# Patient Record
Sex: FEMALE | Race: WHITE | NOT HISPANIC OR LATINO | Employment: OTHER | URBAN - METROPOLITAN AREA
[De-identification: names, ages, dates, MRNs, and addresses within clinical notes are randomized per-mention and may not be internally consistent; named-entity substitution may affect disease eponyms.]

---

## 2017-12-11 PROBLEM — S82.891A CLOSED RIGHT ANKLE FRACTURE: Status: ACTIVE | Noted: 2017-12-11

## 2017-12-14 PROBLEM — S82.891A CLOSED FRACTURE OF RIGHT ANKLE: Status: ACTIVE | Noted: 2017-12-14

## 2018-01-26 PROBLEM — B99.9 INFECTION: Status: ACTIVE | Noted: 2018-01-26

## 2018-01-26 PROBLEM — T81.30XA WOUND DEHISCENCE: Status: ACTIVE | Noted: 2018-01-26

## 2018-01-26 PROBLEM — T81.49XA WOUND INFECTION AFTER SURGERY: Status: ACTIVE | Noted: 2018-01-26

## 2018-02-08 ENCOUNTER — OFFICE VISIT (OUTPATIENT)
Dept: INFECTIOUS DISEASES | Facility: CLINIC | Age: 33
End: 2018-02-08
Payer: MEDICAID

## 2018-02-08 VITALS — SYSTOLIC BLOOD PRESSURE: 134 MMHG | DIASTOLIC BLOOD PRESSURE: 92 MMHG | HEART RATE: 83 BPM

## 2018-02-08 PROCEDURE — 99213 OFFICE O/P EST LOW 20 MIN: CPT | Mod: PBBFAC,PO | Performed by: INTERNAL MEDICINE

## 2018-02-08 PROCEDURE — 3008F BODY MASS INDEX DOCD: CPT | Mod: ,,, | Performed by: INTERNAL MEDICINE

## 2018-02-08 PROCEDURE — 99212 OFFICE O/P EST SF 10 MIN: CPT | Mod: S$PBB,,, | Performed by: INTERNAL MEDICINE

## 2018-02-08 PROCEDURE — 99999 PR PBB SHADOW E&M-EST. PATIENT-LVL III: CPT | Mod: PBBFAC,,, | Performed by: INTERNAL MEDICINE

## 2018-02-08 NOTE — PROGRESS NOTES
Subjective:      Patient ID: Praveena Rowe is a 32 y.o. female.    Chief Complaint:Follow-up (right foot)      History of Present Illness  HPI     Follow-up    Additional comments: right foot       Last edited by Yolis Zhao LPN on 2/8/2018  2:14 PM. (History)      A 32-year-old woman with anxiety disorder, Bipolar type 1, DM, and thyroid disease who while sleep walking had an injury to her right ankle. She underwent ORIF on 12/14 and at the 6 week outpatient follow up visit was noted to an erythematous wound. She was taken to the OR for a post operative wound infection and washout with wound VAC placement. She was discharged on a 2 week course of ceftriaxone from date of operative intervention. She is her for follow up and has no complaints. She inquires about weight bearing status.    Review of Systems   Constitution: Negative for chills, decreased appetite, fever, weakness, malaise/fatigue, night sweats, weight gain and weight loss.   HENT: Negative for congestion, ear pain, hearing loss, hoarse voice, sore throat and tinnitus.    Eyes: Negative for blurred vision, redness and visual disturbance.   Cardiovascular: Negative for chest pain, leg swelling and palpitations.   Respiratory: Negative for cough, hemoptysis, shortness of breath and sputum production.    Hematologic/Lymphatic: Negative for adenopathy. Does not bruise/bleed easily.   Skin: Negative for dry skin, itching, rash and suspicious lesions.   Musculoskeletal: Negative for back pain, joint pain, myalgias and neck pain.   Gastrointestinal: Positive for diarrhea (one episode ). Negative for abdominal pain, constipation, heartburn, nausea and vomiting.   Genitourinary: Negative for dysuria, flank pain, frequency, hematuria, hesitancy and urgency.   Neurological: Negative for dizziness, headaches, numbness and paresthesias.   Psychiatric/Behavioral: Negative for depression and memory loss. The patient does not have insomnia and is not  nervous/anxious.      Objective:   Physical Exam   Constitutional: She is oriented to person, place, and time. She appears well-developed and well-nourished. She is cooperative. She is easily aroused.  Non-toxic appearance. No distress.   Obese   HENT:   Head: Normocephalic and atraumatic. Head is without right periorbital erythema and without left periorbital erythema.   Right Ear: Hearing, tympanic membrane, external ear and ear canal normal. No swelling.   Left Ear: Hearing, tympanic membrane, external ear and ear canal normal. No swelling.   Nose: Nose normal. No nasal deformity.   Mouth/Throat: Uvula is midline, oropharynx is clear and moist and mucous membranes are normal. No oropharyngeal exudate.   Eyes: Conjunctivae, EOM and lids are normal. Pupils are equal, round, and reactive to light. Right eye exhibits no discharge and no exudate. Left eye exhibits no discharge and no exudate. Right conjunctiva is not injected. Left conjunctiva is not injected. No scleral icterus.   Neck: Normal range of motion. Neck supple. No tracheal deviation present. No thyromegaly present.   Cardiovascular: Normal rate, regular rhythm, S1 normal, S2 normal, normal heart sounds and intact distal pulses.  Exam reveals no gallop and no friction rub.    No murmur heard.  Pulmonary/Chest: Effort normal and breath sounds normal. No accessory muscle usage or stridor. No tachypnea. No respiratory distress. She has no wheezes. She has no rales. She exhibits no tenderness.   Abdominal: Soft. Normal appearance and bowel sounds are normal. She exhibits no distension. There is no tenderness. There is no rebound and no guarding.   Musculoskeletal: Normal range of motion. She exhibits no edema or tenderness.        Right ankle: She exhibits swelling.        Feet:    Neurological: She is alert, oriented to person, place, and time and easily aroused. No cranial nerve deficit. Coordination normal.   Skin: Skin is warm and dry. No lesion and no  rash noted. She is not diaphoretic. No cyanosis or erythema. No pallor. Nails show no clubbing.   Psychiatric: She has a normal mood and affect. Her speech is normal and behavior is normal. Judgment and thought content normal.   Nursing note and vitals reviewed.    Assessment:       1. Postoperative wound infection, subsequent encounter          Plan:   Patient is afebrile and without leukocytosis. ESR and CRP reviewed. ESR up when compared to 9 days ago and it's significance is unclear. CRP with slight down trend. No obvious signs of infection and wound healing.   · Complete ceftriaxone. Has 5 more days left.  · Remove PICC line on 2/14/18.  · RTC as needed.

## 2018-08-06 ENCOUNTER — HOSPITAL ENCOUNTER (EMERGENCY)
Facility: HOSPITAL | Age: 33
Discharge: PSYCHIATRIC OR SUBSTANCE USE FACILITY | End: 2018-08-07
Attending: EMERGENCY MEDICINE
Payer: MEDICAID

## 2018-08-06 DIAGNOSIS — R45.851 SUICIDAL IDEATION: Primary | ICD-10-CM

## 2018-08-06 DIAGNOSIS — Z00.8 MEDICAL CLEARANCE FOR PSYCHIATRIC ADMISSION: ICD-10-CM

## 2018-08-06 LAB
ALBUMIN SERPL BCP-MCNC: 4 G/DL
ALP SERPL-CCNC: 85 U/L
ALT SERPL W/O P-5'-P-CCNC: 67 U/L
AMPHET+METHAMPHET UR QL: NORMAL
ANION GAP SERPL CALC-SCNC: 11 MMOL/L
APAP SERPL-MCNC: <3 UG/ML
AST SERPL-CCNC: 38 U/L
B-HCG UR QL: NEGATIVE
BARBITURATES UR QL SCN>200 NG/ML: NEGATIVE
BASOPHILS # BLD AUTO: 0 K/UL
BASOPHILS NFR BLD: 0.4 %
BENZODIAZ UR QL SCN>200 NG/ML: NEGATIVE
BILIRUB SERPL-MCNC: 0.7 MG/DL
BILIRUB UR QL STRIP: NEGATIVE
BUN SERPL-MCNC: 10 MG/DL
BZE UR QL SCN: NEGATIVE
CALCIUM SERPL-MCNC: 9.5 MG/DL
CANNABINOIDS UR QL SCN: NORMAL
CHLORIDE SERPL-SCNC: 103 MMOL/L
CLARITY UR: ABNORMAL
CO2 SERPL-SCNC: 26 MMOL/L
COLOR UR: YELLOW
CREAT SERPL-MCNC: 0.9 MG/DL
CREAT UR-MCNC: 282.5 MG/DL
CTP QC/QA: YES
DIFFERENTIAL METHOD: NORMAL
EOSINOPHIL # BLD AUTO: 0.2 K/UL
EOSINOPHIL NFR BLD: 2.9 %
ERYTHROCYTE [DISTWIDTH] IN BLOOD BY AUTOMATED COUNT: 14.2 %
EST. GFR  (AFRICAN AMERICAN): >60 ML/MIN/1.73 M^2
EST. GFR  (NON AFRICAN AMERICAN): >60 ML/MIN/1.73 M^2
ETHANOL SERPL-MCNC: <10 MG/DL
GLUCOSE SERPL-MCNC: 81 MG/DL
GLUCOSE UR QL STRIP: NEGATIVE
HCT VFR BLD AUTO: 42.3 %
HGB BLD-MCNC: 14.2 G/DL
HGB UR QL STRIP: NEGATIVE
KETONES UR QL STRIP: NEGATIVE
LEUKOCYTE ESTERASE UR QL STRIP: NEGATIVE
LYMPHOCYTES # BLD AUTO: 2.5 K/UL
LYMPHOCYTES NFR BLD: 33.2 %
MCH RBC QN AUTO: 28.2 PG
MCHC RBC AUTO-ENTMCNC: 33.6 G/DL
MCV RBC AUTO: 84 FL
METHADONE UR QL SCN>300 NG/ML: NEGATIVE
MONOCYTES # BLD AUTO: 0.7 K/UL
MONOCYTES NFR BLD: 9.6 %
NEUTROPHILS # BLD AUTO: 4.1 K/UL
NEUTROPHILS NFR BLD: 53.9 %
NITRITE UR QL STRIP: NEGATIVE
OPIATES UR QL SCN: NEGATIVE
PCP UR QL SCN>25 NG/ML: NEGATIVE
PH UR STRIP: 6 [PH] (ref 5–8)
PLATELET # BLD AUTO: 287 K/UL
PMV BLD AUTO: 11.2 FL
POTASSIUM SERPL-SCNC: 3.5 MMOL/L
PROT SERPL-MCNC: 7.4 G/DL
PROT UR QL STRIP: NEGATIVE
RBC # BLD AUTO: 5.03 M/UL
SALICYLATES SERPL-MCNC: <5 MG/DL
SODIUM SERPL-SCNC: 140 MMOL/L
SP GR UR STRIP: 1.02 (ref 1–1.03)
TOXICOLOGY INFORMATION: NORMAL
TSH SERPL DL<=0.005 MIU/L-ACNC: 2.31 UIU/ML
URN SPEC COLLECT METH UR: ABNORMAL
UROBILINOGEN UR STRIP-ACNC: 1 EU/DL
WBC # BLD AUTO: 7.5 K/UL

## 2018-08-06 PROCEDURE — 96372 THER/PROPH/DIAG INJ SC/IM: CPT

## 2018-08-06 PROCEDURE — 93010 ELECTROCARDIOGRAM REPORT: CPT | Mod: ,,, | Performed by: INTERNAL MEDICINE

## 2018-08-06 PROCEDURE — 93005 ELECTROCARDIOGRAM TRACING: CPT

## 2018-08-06 PROCEDURE — 80329 ANALGESICS NON-OPIOID 1 OR 2: CPT

## 2018-08-06 PROCEDURE — 36415 COLL VENOUS BLD VENIPUNCTURE: CPT

## 2018-08-06 PROCEDURE — 81025 URINE PREGNANCY TEST: CPT | Performed by: EMERGENCY MEDICINE

## 2018-08-06 PROCEDURE — 80307 DRUG TEST PRSMV CHEM ANLYZR: CPT

## 2018-08-06 PROCEDURE — 25000003 PHARM REV CODE 250: Performed by: EMERGENCY MEDICINE

## 2018-08-06 PROCEDURE — 84443 ASSAY THYROID STIM HORMONE: CPT

## 2018-08-06 PROCEDURE — 99285 EMERGENCY DEPT VISIT HI MDM: CPT | Mod: 25

## 2018-08-06 PROCEDURE — 80320 DRUG SCREEN QUANTALCOHOLS: CPT

## 2018-08-06 PROCEDURE — 81003 URINALYSIS AUTO W/O SCOPE: CPT | Mod: 59

## 2018-08-06 PROCEDURE — 80053 COMPREHEN METABOLIC PANEL: CPT

## 2018-08-06 PROCEDURE — 85025 COMPLETE CBC W/AUTO DIFF WBC: CPT

## 2018-08-06 RX ORDER — KETAMINE HYDROCHLORIDE 10 MG/ML
300 INJECTION, SOLUTION INTRAMUSCULAR; INTRAVENOUS
Status: COMPLETED | OUTPATIENT
Start: 2018-08-06 | End: 2018-08-06

## 2018-08-06 RX ORDER — ACETAMINOPHEN 325 MG/1
650 TABLET ORAL
Status: COMPLETED | OUTPATIENT
Start: 2018-08-06 | End: 2018-08-06

## 2018-08-06 RX ORDER — KETAMINE HYDROCHLORIDE 100 MG/ML
INJECTION, SOLUTION INTRAMUSCULAR; INTRAVENOUS
Status: DISPENSED
Start: 2018-08-06 | End: 2018-08-07

## 2018-08-06 RX ADMIN — KETAMINE HYDROCHLORIDE 300 MG: 10 INJECTION INTRAMUSCULAR; INTRAVENOUS at 06:08

## 2018-08-06 RX ADMIN — ACETAMINOPHEN 650 MG: 325 TABLET, FILM COATED ORAL at 02:08

## 2018-08-06 NOTE — ED PROVIDER NOTES
"Encounter Date: 8/6/2018    SCRIBE #1 NOTE: I, Lionel Esposito, am scribing for, and in the presence of, Dr. Ag.       History     Chief Complaint   Patient presents with    Psychiatric Evaluation     suicidal ideations     08/06/2018  10:50 AM     Praveena Rowe is a 32 y.o. female who has a pmhx of anxiety, depression, DM, Bipolar and insomnia is presenting to ED for psychiatric evaluation, sent from Dr. Magallon, for suicidal ideation. Pt had an appointment with Dr. Magallon today for an evaluation of her medications. Pt informed Dr. Magallon that she had two panic attack, she doesn't leave her house very often, and recurrent suicidal thoughts. Pt states, "I always think about hurting myself. I just want to die." She also reports sores all over body from picking at skin and cutting on stomach with razor blade when she is really sad. She has a hx of suicidal attempts with pills and cutting herself. She states that her medication is not helping with her depression disorder and she does not want to live with depression anymore. She smoked marijuana two days ago. She reports visual hallucinations. Pt has a past surgical history that includes Cyst Removal; Ankle fracture surgery; and Ankle debridement.       The history is provided by the patient.     Review of patient's allergies indicates:   Allergen Reactions    Amoxil [amoxicillin]      Past Medical History:   Diagnosis Date    Anxiety     Bipolar 1 disorder, depressed, severe     Depression     Diabetes mellitus     "pre diabetes"    Insomnia     Thyroid disease      Past Surgical History:   Procedure Laterality Date    ANKLE DEBRIDEMENT Right 01/26/2018    irrigation and debridement    ANKLE FRACTURE SURGERY      CYST REMOVAL      pt states she had a cyst removed from base of her brain when she was 13    ORIF FIBULA FRACTURE Right 12/14/2017     History reviewed. No pertinent family history.  Social History   Substance Use Topics    Smoking status: " "Never Smoker    Smokeless tobacco: Never Used    Alcohol use Yes      Comment: socially     Review of Systems   Constitutional: Negative for fever.   HENT: Negative for sore throat.    Eyes: Negative for visual disturbance.   Respiratory: Negative for cough.    Cardiovascular: Negative for chest pain.   Gastrointestinal: Negative for abdominal pain, diarrhea, nausea and vomiting.   Genitourinary: Negative for difficulty urinating and pelvic pain.   Musculoskeletal: Negative for arthralgias.   Skin: Negative for rash.        Positive for "sores" over generalized body   Neurological: Negative for weakness.   Psychiatric/Behavioral: Positive for dysphoric mood, hallucinations (visual), self-injury and suicidal ideas. Negative for confusion.       Physical Exam     Initial Vitals [08/06/18 1025]   BP Pulse Resp Temp SpO2   (!) 170/96 78 20 -- 97 %      MAP       --         Physical Exam    Nursing note and vitals reviewed.  Constitutional: She appears well-developed and well-nourished. She is not diaphoretic. No distress.   HENT:   Head: Normocephalic and atraumatic.   Mouth/Throat: Oropharynx is clear and moist.   Eyes: Conjunctivae are normal.   Neck: Neck supple.   Cardiovascular: Normal rate, regular rhythm, normal heart sounds and intact distal pulses. Exam reveals no gallop and no friction rub.    No murmur heard.  Pulmonary/Chest: Breath sounds normal. She has no wheezes. She has no rhonchi. She has no rales.   Abdominal: Soft. She exhibits no distension. There is no tenderness.   Musculoskeletal: Normal range of motion.   Neurological: She is alert and oriented to person, place, and time.   Skin: Abrasion, lesion and rash noted. Rash is papular. There is erythema.   3 cm abrasion to LUQ. Scattered papular lesions with excoriation throughout trunk and extremities. Minimum erythema.    Psychiatric: Her speech is normal. She is actively hallucinating. She expresses suicidal ideation. She expresses no homicidal " ideation. She expresses suicidal plans. She expresses no homicidal plans.   Cooperative.          ED Course   Procedures  Labs Reviewed   COMPREHENSIVE METABOLIC PANEL - Abnormal; Notable for the following:        Result Value    ALT 67 (*)     All other components within normal limits   URINALYSIS - Abnormal; Notable for the following:     Appearance, UA Hazy (*)     All other components within normal limits   ACETAMINOPHEN LEVEL - Abnormal; Notable for the following:     Acetaminophen (Tylenol), Serum <3.0 (*)     All other components within normal limits   SALICYLATE LEVEL - Abnormal; Notable for the following:     Salicylate Lvl <5.0 (*)     All other components within normal limits   CBC W/ AUTO DIFFERENTIAL   TSH   DRUG SCREEN PANEL, URINE EMERGENCY   ALCOHOL,MEDICAL (ETHANOL)   POCT URINE PREGNANCY          Imaging Results    None          Medical Decision Making:   History:   Old Medical Records: I decided to obtain old medical records.  Clinical Tests:   Lab Tests: Ordered and Reviewed  Medical Tests: Ordered and Reviewed            Scribe Attestation:   Scribe #1: I performed the above scribed service and the documentation accurately describes the services I performed. I attest to the accuracy of the note.    I, Dr. Levra Ag, personally performed the services described in this documentation. All medical record entries made by the scribe were at my direction and in my presence.  I have reviewed the chart and agree that the record reflects my personal performance and is accurate and complete. Levar Ag MD.  5:22 PM 08/06/2018    Praveena Rowe is a 32 y.o. female presenting with suicidal ideation. I did perform a focused medical assessment to explore alternative etiologies relating to the patient's presentation or conditions in need of emergent stabilization in the emergency department.  None are evident.  The patient is medically cleared for transfer to facilitate further psychiatric  evaluation.          ED Course as of Aug 06 1723   Mon Aug 06, 2018   1141 EKG:  Normal sinus rhythm at a rate of 84.  Normal intervals.  Normal axis.  No sign of acute intoxication.    [MR]      ED Course User Index  [MR] Levar Ag MD     Clinical Impression:     1. Suicidal ideation    2. Medical clearance for psychiatric admission                                   Levar Ag MD  08/06/18 4923

## 2018-08-06 NOTE — ED NOTES
Pt presents to ED c/o increased depression and anxiety attacks over the past several days. She currently denies SI/HI but sts that she has been suicidal in the past. Last inpatient psych visit was about 1 year ago. AAOx4. Skin warm, pink, and dry. Ambulatory. Family at bedside. Updated on POC and reports understanding. Suicide precautions in place. No sitter available. Charge nurse and house supervisor aware. Pt in room next to nurses station and able to be easily viewed.

## 2018-08-07 VITALS
HEART RATE: 74 BPM | RESPIRATION RATE: 20 BRPM | OXYGEN SATURATION: 98 % | WEIGHT: 293 LBS | SYSTOLIC BLOOD PRESSURE: 121 MMHG | TEMPERATURE: 98 F | DIASTOLIC BLOOD PRESSURE: 67 MMHG | BODY MASS INDEX: 62.65 KG/M2

## 2018-08-07 NOTE — ED NOTES
Patient accepted by Jim at [facility] ([address] [place] [address]) for the service of Dr Pan.  Report to be called to [phone].  Request made to call report in one hour.

## 2018-08-07 NOTE — ED NOTES
Pt became agitated and attempted to leave. [facility] called. Security arrived immediately. Pt given IM ketamine. House supervisor and charge nurse were also at bedside.

## 2018-08-07 NOTE — ED NOTES
Admit packet faxed to the following facilities: [facility], [facility], [facility], and [facility].

## 2018-08-07 NOTE — ED NOTES
Pt sleeping soundly, CPAP in place, sitter in doorway, no needs identified at this time, will continue to monitor.

## 2018-08-07 NOTE — ED NOTES
Pt sleeping soundly, CPAP in place, boyfriend at bedside, sitter in doorway. No need identified at this time, will continue to monitor.

## 2018-08-07 NOTE — ED NOTES
Report called to Argentina TRUJILLO at [facility], transportation established with [facility]

## 2018-08-07 NOTE — ED NOTES
Pt sleeping upon entering room, boyfriend at the bedside with sitter in doorway. Chest rise symmetrical, connected to heart monitor and pulse ox, VSS.

## 2018-08-07 NOTE — ED NOTES
Pt is medically cleared for PSY facility per Dr. Ag. PEC faxed to [facility] and .

## 2018-08-07 NOTE — ED NOTES
Admit packet refaxed to the following facilities: [facility], [facility], [facility], [facility], [facility], [facility], [facility], [facility], [facility], [facility], [facility], [facility], [facility], [facility], [facility], [facility], [facility], [facility], [facility], [facility], [facility], [facility], [facility], [facility], [facility], [facility], [facility], [facility], [facility], [facility], [place] and [facility].

## 2018-08-07 NOTE — ED NOTES
Updated admit packet faxed to [facility], [facility], [facility], and [facility]. [facility], [facility], [facility], [facility], [facility], [facility], [facility], [place] [facility], , [facility] [place], [facility], [facility], [facility], [facility], [facility], Waiting for response.

## 2018-08-07 NOTE — ED NOTES
Admit packet faxed to the following facilities:   [facility], [facility], [place], [facility], [facility], [facility], [facility], [facility], [facility],  [facility], [facility], [facility], [facility], [facility], [facility], [facility], [facility], [facility], [facility], [facility], [facility], [facility], [facility], [facility] and  [place].

## 2018-08-07 NOTE — ED NOTES
Pt connected to cardiac monitor and pulse ox, still awake and able to hold conversation. Soft restraints in place to ravi upper extremities, neurovascular intact.

## 2023-05-31 ENCOUNTER — HOSPITAL ENCOUNTER (EMERGENCY)
Facility: HOSPITAL | Age: 38
Discharge: PSYCHIATRIC OR SUBSTANCE USE FACILITY | End: 2023-05-31
Attending: EMERGENCY MEDICINE
Payer: MEDICARE

## 2023-05-31 VITALS
WEIGHT: 293 LBS | HEIGHT: 66 IN | TEMPERATURE: 98 F | DIASTOLIC BLOOD PRESSURE: 80 MMHG | OXYGEN SATURATION: 95 % | BODY MASS INDEX: 47.09 KG/M2 | RESPIRATION RATE: 18 BRPM | SYSTOLIC BLOOD PRESSURE: 124 MMHG | HEART RATE: 74 BPM

## 2023-05-31 DIAGNOSIS — F32.A DEPRESSION, UNSPECIFIED DEPRESSION TYPE: ICD-10-CM

## 2023-05-31 DIAGNOSIS — R45.851 SUICIDAL IDEATION: Primary | ICD-10-CM

## 2023-05-31 LAB
ALBUMIN SERPL BCP-MCNC: 3.8 G/DL (ref 3.5–5.2)
ALP SERPL-CCNC: 60 U/L (ref 55–135)
ALT SERPL W/O P-5'-P-CCNC: 14 U/L (ref 10–44)
AMPHET+METHAMPHET UR QL: NEGATIVE
ANION GAP SERPL CALC-SCNC: 5 MMOL/L (ref 8–16)
APAP SERPL-MCNC: <10 UG/ML (ref 10–20)
AST SERPL-CCNC: 13 U/L (ref 10–40)
BACTERIA #/AREA URNS HPF: ABNORMAL /HPF
BARBITURATES UR QL SCN>200 NG/ML: NEGATIVE
BASOPHILS # BLD AUTO: 0.09 K/UL (ref 0–0.2)
BASOPHILS NFR BLD: 0.9 % (ref 0–1.9)
BENZODIAZ UR QL SCN>200 NG/ML: NEGATIVE
BILIRUB SERPL-MCNC: 0.5 MG/DL (ref 0.1–1)
BILIRUB UR QL STRIP: NEGATIVE
BUN SERPL-MCNC: 10 MG/DL (ref 6–20)
BZE UR QL SCN: NEGATIVE
CALCIUM SERPL-MCNC: 8.7 MG/DL (ref 8.7–10.5)
CANNABINOIDS UR QL SCN: NEGATIVE
CHLORIDE SERPL-SCNC: 106 MMOL/L (ref 95–110)
CLARITY UR: ABNORMAL
CO2 SERPL-SCNC: 25 MMOL/L (ref 23–29)
COLOR UR: YELLOW
CREAT SERPL-MCNC: 1 MG/DL (ref 0.5–1.4)
CREAT UR-MCNC: 91 MG/DL (ref 15–325)
DIFFERENTIAL METHOD: ABNORMAL
EOSINOPHIL # BLD AUTO: 0.4 K/UL (ref 0–0.5)
EOSINOPHIL NFR BLD: 3.7 % (ref 0–8)
ERYTHROCYTE [DISTWIDTH] IN BLOOD BY AUTOMATED COUNT: 13.9 % (ref 11.5–14.5)
EST. GFR  (NO RACE VARIABLE): >60 ML/MIN/1.73 M^2
ETHANOL SERPL-MCNC: <5 MG/DL
GLUCOSE SERPL-MCNC: 87 MG/DL (ref 70–110)
GLUCOSE UR QL STRIP: NEGATIVE
HCT VFR BLD AUTO: 37.2 % (ref 37–48.5)
HGB BLD-MCNC: 12.1 G/DL (ref 12–16)
HGB UR QL STRIP: NEGATIVE
HYALINE CASTS #/AREA URNS LPF: 26 /LPF
IMM GRANULOCYTES # BLD AUTO: 0.06 K/UL (ref 0–0.04)
IMM GRANULOCYTES NFR BLD AUTO: 0.6 % (ref 0–0.5)
KETONES UR QL STRIP: NEGATIVE
LEUKOCYTE ESTERASE UR QL STRIP: ABNORMAL
LYMPHOCYTES # BLD AUTO: 2.2 K/UL (ref 1–4.8)
LYMPHOCYTES NFR BLD: 21.4 % (ref 18–48)
MCH RBC QN AUTO: 28.3 PG (ref 27–31)
MCHC RBC AUTO-ENTMCNC: 32.5 G/DL (ref 32–36)
MCV RBC AUTO: 87 FL (ref 82–98)
MICROSCOPIC COMMENT: ABNORMAL
MONOCYTES # BLD AUTO: 0.8 K/UL (ref 0.3–1)
MONOCYTES NFR BLD: 8.1 % (ref 4–15)
NEUTROPHILS # BLD AUTO: 6.7 K/UL (ref 1.8–7.7)
NEUTROPHILS NFR BLD: 65.3 % (ref 38–73)
NITRITE UR QL STRIP: NEGATIVE
NRBC BLD-RTO: 0 /100 WBC
OPIATES UR QL SCN: NEGATIVE
PCP UR QL SCN>25 NG/ML: NEGATIVE
PH UR STRIP: 7 [PH] (ref 5–8)
PLATELET # BLD AUTO: 292 K/UL (ref 150–450)
PMV BLD AUTO: 12.5 FL (ref 9.2–12.9)
POTASSIUM SERPL-SCNC: 3.7 MMOL/L (ref 3.5–5.1)
PROT SERPL-MCNC: 7.1 G/DL (ref 6–8.4)
PROT UR QL STRIP: ABNORMAL
RBC # BLD AUTO: 4.27 M/UL (ref 4–5.4)
RBC #/AREA URNS HPF: 17 /HPF (ref 0–4)
SALICYLATES SERPL-MCNC: <4 MG/DL (ref 15–30)
SARS-COV-2 RDRP RESP QL NAA+PROBE: NEGATIVE
SODIUM SERPL-SCNC: 136 MMOL/L (ref 136–145)
SP GR UR STRIP: 1.01 (ref 1–1.03)
SQUAMOUS #/AREA URNS HPF: 52 /HPF
TOXICOLOGY INFORMATION: NORMAL
TSH SERPL DL<=0.005 MIU/L-ACNC: 2.4 UIU/ML (ref 0.34–5.6)
URN SPEC COLLECT METH UR: ABNORMAL
UROBILINOGEN UR STRIP-ACNC: NEGATIVE EU/DL
WBC # BLD AUTO: 10.22 K/UL (ref 3.9–12.7)
WBC #/AREA URNS HPF: 9 /HPF (ref 0–5)

## 2023-05-31 PROCEDURE — 80053 COMPREHEN METABOLIC PANEL: CPT | Performed by: STUDENT IN AN ORGANIZED HEALTH CARE EDUCATION/TRAINING PROGRAM

## 2023-05-31 PROCEDURE — 82077 ASSAY SPEC XCP UR&BREATH IA: CPT | Performed by: STUDENT IN AN ORGANIZED HEALTH CARE EDUCATION/TRAINING PROGRAM

## 2023-05-31 PROCEDURE — 85025 COMPLETE CBC W/AUTO DIFF WBC: CPT | Performed by: STUDENT IN AN ORGANIZED HEALTH CARE EDUCATION/TRAINING PROGRAM

## 2023-05-31 PROCEDURE — 84443 ASSAY THYROID STIM HORMONE: CPT | Performed by: STUDENT IN AN ORGANIZED HEALTH CARE EDUCATION/TRAINING PROGRAM

## 2023-05-31 PROCEDURE — 80179 DRUG ASSAY SALICYLATE: CPT | Performed by: STUDENT IN AN ORGANIZED HEALTH CARE EDUCATION/TRAINING PROGRAM

## 2023-05-31 PROCEDURE — 80143 DRUG ASSAY ACETAMINOPHEN: CPT | Performed by: STUDENT IN AN ORGANIZED HEALTH CARE EDUCATION/TRAINING PROGRAM

## 2023-05-31 PROCEDURE — U0002 COVID-19 LAB TEST NON-CDC: HCPCS | Performed by: STUDENT IN AN ORGANIZED HEALTH CARE EDUCATION/TRAINING PROGRAM

## 2023-05-31 PROCEDURE — 81001 URINALYSIS AUTO W/SCOPE: CPT | Mod: 59 | Performed by: STUDENT IN AN ORGANIZED HEALTH CARE EDUCATION/TRAINING PROGRAM

## 2023-05-31 PROCEDURE — 99285 EMERGENCY DEPT VISIT HI MDM: CPT

## 2023-05-31 PROCEDURE — 80307 DRUG TEST PRSMV CHEM ANLYZR: CPT | Performed by: STUDENT IN AN ORGANIZED HEALTH CARE EDUCATION/TRAINING PROGRAM

## 2023-05-31 NOTE — ED NOTES
Patient advised to call mother to tell her to bring clothes and cpap for night use. Mother Anna called at [phone] and advised of ETA pickup for 1830. States she is on the way.

## 2023-05-31 NOTE — ED PROVIDER NOTES
"Encounter Date: 5/31/2023       History     Chief Complaint   Patient presents with    Mental Health Problem     Sent from [facility] for suicidal ideations. Pt told  that she feels very depressed and wants to harm herself.     HPI  37-year-old female past medical history depression, DM, thyroid disease presents emergency department from [facility] behavioral group therapy for suicidal ideation.  Patient reports worsening depression over the last several weeks now with suicidal x1 week.  Patient reports her plan is to overdose.  She has 2 prior suicide attempts with overdose in January and February of this year.  She has required psychiatric hospitalization in the past for this.  Patient reports feeling increasingly depressed, I have nothing to live for, I do not want to do anything, the home life is terrible but I can't do anything about it."  Patient currently lives at home with her mother and her boyfriend.  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  Patient currently on Wellbutrin and Abilify, recently went up on Wellbutrin dose last week but states she feels like this is not working.  Patient states all she does is lay in bed all day and doesn't want to leave her room. No physical complaints at this time.  Denies any auditory or visual hallucinations.    Review of patient's allergies indicates:   Allergen Reactions    Amoxil [amoxicillin]      Past Medical History:   Diagnosis Date    Anxiety     Bipolar 1 disorder, depressed, severe     Depression     Diabetes mellitus     "pre diabetes"    Insomnia     Thyroid disease      Past Surgical History:   Procedure Laterality Date    ANKLE DEBRIDEMENT Right 01/26/2018    irrigation and debridement    ANKLE FRACTURE SURGERY      CYST REMOVAL      pt states she had a cyst removed from base of her brain when she was 13    ORIF FIBULA FRACTURE Right " 12/14/2017     No family history on file.  Social History     Tobacco Use    Smoking status: Never    Smokeless tobacco: Never   Substance Use Topics    Alcohol use: Yes     Comment: socially    Drug use: Yes     Types: Marijuana     Review of Systems   Constitutional:  Negative for fever.   Respiratory:  Negative for shortness of breath.    Cardiovascular:  Negative for chest pain.   Gastrointestinal:  Negative for abdominal pain, nausea and vomiting.   Genitourinary:  Negative for dysuria.   Musculoskeletal:  Negative for myalgias.   Neurological:  Negative for headaches.   Psychiatric/Behavioral:  Positive for suicidal ideas.      Physical Exam     Initial Vitals [05/31/23 1325]   BP Pulse Resp Temp SpO2   (!) 163/91 105 18 98 °F (36.7 °C) 99 %      MAP       --         Physical Exam    Constitutional: She appears well-developed.   Elevated BMI   HENT:   Head: Normocephalic.   Eyes: EOM are normal.   Cardiovascular:  Normal rate and regular rhythm.     Exam reveals no gallop and no friction rub.       No murmur heard.  Pulmonary/Chest: Breath sounds normal. No respiratory distress. She has no wheezes. She has no rhonchi. She has no rales.   Musculoskeletal:         General: No tenderness or edema. Normal range of motion.     Neurological: She is alert and oriented to person, place, and time. GCS score is 15. GCS eye subscore is 4. GCS verbal subscore is 5. GCS motor subscore is 6.   Skin: Skin is warm and dry.   Psychiatric:   Flat affect, tearful        ED Course   Procedures  Labs Reviewed   CBC W/ AUTO DIFFERENTIAL - Abnormal; Notable for the following components:       Result Value    Immature Granulocytes 0.6 (*)     Immature Grans (Abs) 0.06 (*)     All other components within normal limits   COMPREHENSIVE METABOLIC PANEL - Abnormal; Notable for the following components:    Anion Gap 5 (*)     All other components within normal limits   URINALYSIS, REFLEX TO URINE CULTURE - Abnormal; Notable for the  "following components:    Appearance, UA Cloudy (*)     Protein, UA Trace (*)     Leukocytes, UA 2+ (*)     All other components within normal limits    Narrative:     Specimen Source->Urine   SALICYLATE LEVEL - Abnormal; Notable for the following components:    Salicylate Lvl <4.0 (*)     All other components within normal limits   URINALYSIS MICROSCOPIC - Abnormal; Notable for the following components:    RBC, UA 17 (*)     WBC, UA 9 (*)     Hyaline Casts, UA 26 (*)     All other components within normal limits    Narrative:     Specimen Source->Urine   TSH   DRUG SCREEN PANEL, URINE EMERGENCY    Narrative:     Specimen Source->Urine   ALCOHOL,MEDICAL (ETHANOL)   ACETAMINOPHEN LEVEL   SARS-COV-2 RNA AMPLIFICATION, QUAL          Imaging Results    None          Medications - No data to display  Medical Decision Making:   Initial Assessment:   37-year-old female past medical history depression, DM, thyroid disease presents emergency department from [facility] behavioral group therapy for suicidal ideation.  Patient reports worsening depression over the last several weeks now with suicidal x1 week.  Patient reports her plan is to overdose.  She has 2 prior suicide attempts with overdose in January and February of this year.  She has required psychiatric hospitalization in the past for this.  Patient reports feeling increasingly depressed, "I have nothing to live for, I do not want to do anything, the home life is terrible but I can't do anything about it."  Patient currently lives at home with her mother and her boyfriend.  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  Patient currently on Wellbutrin and Abilify, recently went up on Wellbutrin dose last week but states she feels like this is not working.  Patient states all she does is lay in bed all day and doesn't want to leave her room. No physical complaints at this " time.  Denies any auditory or visual hallucinations.  On exam patient is tearful, flat affect.  She is responding to questions appropriately but appears very depressed. Physical exam grossly unremarkable.  Differential Diagnosis:   Depression, suicidal ideation  Clinical Tests:   Lab Tests: Ordered and Reviewed       <> Summary of Lab: See ED course  ED Management:  Patient presenting from the [facility] group after expressing suicidal ideation.  Patient has been experiencing increased suicidal thoughts this week after increasing Wellbutrin dose.  She does not feel this medication is working.  On exam patient has flat affect and very tearful.  States her plan is to overdose, has 2 prior overdose attempts in the past requiring hospitalization. Placed under PEC.  Patient is now medically cleared for psychiatric hospitalization.  Ariella Van   Emergency Medicine PGY3  4:07 PM             ED Course as of 05/31/23 1607   Wed May 31, 2023   1525 WBC: 10.22  No leukocytosis [MS]   1525 Hemoglobin: 12.1  Within normal limits [MS]   1559 Urinalysis Microscopic(!)  Urinalysis with some red blood cells, 9 white blood cells, occasional bacteria however significant squamous cells.  Not clean catch.  Patient without any urinary symptoms at this time. [MS]   1600 TSH: 2.400  Within normal limits. [MS]   1600 Comprehensive metabolic panel(!)  Within normal limits. [MS]   1600 COVID-19 Rapid Screening  COVID negative. [MS]   1600 Drug screen panel, emergency  UDS negative. [MS]      ED Course User Index  [MS] Ariella Van MD       Medically cleared for psychiatry placement: 5/31/2023  4:05 PM         Clinical Impression:   Final diagnoses:  [R45.851] Suicidal ideation (Primary)  [F32.A] Depression, unspecified depression type        ED Disposition Condition    Transfer to Psych Facility Stable          ED Prescriptions    None       Follow-up Information    None          Ariella Van  MD  Resident  05/31/23 4640

## 2023-06-01 PROBLEM — F31.9 BIPOLAR 1 DISORDER: Status: ACTIVE | Noted: 2023-06-01

## 2023-06-01 PROBLEM — R73.03 PREDIABETES: Status: ACTIVE | Noted: 2023-06-01

## 2023-06-01 PROBLEM — E03.9 HYPOTHYROID: Status: ACTIVE | Noted: 2023-06-01

## 2023-06-01 PROBLEM — R03.0 ELEVATED BP WITHOUT DIAGNOSIS OF HYPERTENSION: Status: RESOLVED | Noted: 2023-06-01 | Resolved: 2023-06-01

## 2023-06-01 PROBLEM — R03.0 ELEVATED BP WITHOUT DIAGNOSIS OF HYPERTENSION: Status: ACTIVE | Noted: 2023-06-01

## 2023-06-01 PROBLEM — E66.01 MORBID OBESITY: Status: ACTIVE | Noted: 2023-06-01

## 2023-06-01 PROBLEM — I10 PRIMARY HYPERTENSION: Status: ACTIVE | Noted: 2023-06-01

## 2023-06-01 PROBLEM — E78.5 HLD (HYPERLIPIDEMIA): Status: ACTIVE | Noted: 2023-06-01

## 2023-06-03 PROBLEM — R45.851 SUICIDAL IDEATION: Status: ACTIVE | Noted: 2023-06-03

## 2024-07-28 ENCOUNTER — HOSPITAL ENCOUNTER (EMERGENCY)
Facility: HOSPITAL | Age: 39
Discharge: PSYCHIATRIC OR SUBSTANCE USE FACILITY | End: 2024-07-29
Attending: STUDENT IN AN ORGANIZED HEALTH CARE EDUCATION/TRAINING PROGRAM
Payer: MEDICARE

## 2024-07-28 DIAGNOSIS — R45.851 SUICIDE IDEATION: Primary | ICD-10-CM

## 2024-07-28 LAB
ALBUMIN SERPL BCP-MCNC: 4.4 G/DL (ref 3.5–5.2)
ALP SERPL-CCNC: 68 U/L (ref 55–135)
ALT SERPL W/O P-5'-P-CCNC: 29 U/L (ref 10–44)
ANION GAP SERPL CALC-SCNC: 12 MMOL/L (ref 8–16)
APAP SERPL-MCNC: <0.1 UG/ML (ref 10–20)
AST SERPL-CCNC: 19 U/L (ref 10–40)
BASOPHILS # BLD AUTO: 0.08 K/UL (ref 0–0.2)
BASOPHILS NFR BLD: 1 % (ref 0–1.9)
BILIRUB SERPL-MCNC: 0.6 MG/DL (ref 0.1–1)
BUN SERPL-MCNC: 7 MG/DL (ref 6–20)
CALCIUM SERPL-MCNC: 8.9 MG/DL (ref 8.7–10.5)
CHLORIDE SERPL-SCNC: 105 MMOL/L (ref 95–110)
CO2 SERPL-SCNC: 22 MMOL/L (ref 23–29)
CREAT SERPL-MCNC: 0.9 MG/DL (ref 0.5–1.4)
DIFFERENTIAL METHOD BLD: ABNORMAL
EOSINOPHIL # BLD AUTO: 0.2 K/UL (ref 0–0.5)
EOSINOPHIL NFR BLD: 2.2 % (ref 0–8)
ERYTHROCYTE [DISTWIDTH] IN BLOOD BY AUTOMATED COUNT: 14.8 % (ref 11.5–14.5)
EST. GFR  (NO RACE VARIABLE): >60 ML/MIN/1.73 M^2
ETHANOL SERPL-MCNC: <10 MG/DL
GLUCOSE SERPL-MCNC: 105 MG/DL (ref 70–110)
HCT VFR BLD AUTO: 46.1 % (ref 37–48.5)
HGB BLD-MCNC: 15.4 G/DL (ref 12–16)
IMM GRANULOCYTES # BLD AUTO: 0.02 K/UL (ref 0–0.04)
IMM GRANULOCYTES NFR BLD AUTO: 0.2 % (ref 0–0.5)
LYMPHOCYTES # BLD AUTO: 2.6 K/UL (ref 1–4.8)
LYMPHOCYTES NFR BLD: 32 % (ref 18–48)
MCH RBC QN AUTO: 28.3 PG (ref 27–31)
MCHC RBC AUTO-ENTMCNC: 33.4 G/DL (ref 32–36)
MCV RBC AUTO: 85 FL (ref 82–98)
MONOCYTES # BLD AUTO: 0.7 K/UL (ref 0.3–1)
MONOCYTES NFR BLD: 8.7 % (ref 4–15)
NEUTROPHILS # BLD AUTO: 4.6 K/UL (ref 1.8–7.7)
NEUTROPHILS NFR BLD: 55.9 % (ref 38–73)
NRBC BLD-RTO: 0 /100 WBC
PLATELET # BLD AUTO: 305 K/UL (ref 150–450)
PMV BLD AUTO: 12.2 FL (ref 9.2–12.9)
POTASSIUM SERPL-SCNC: 3.6 MMOL/L (ref 3.5–5.1)
PROT SERPL-MCNC: 7.4 G/DL (ref 6–8.4)
RBC # BLD AUTO: 5.44 M/UL (ref 4–5.4)
SALICYLATES SERPL-MCNC: <1.5 MG/DL (ref 15–30)
SARS-COV-2 RDRP RESP QL NAA+PROBE: NEGATIVE
SODIUM SERPL-SCNC: 139 MMOL/L (ref 136–145)
TSH SERPL DL<=0.005 MIU/L-ACNC: 3.5 UIU/ML (ref 0.34–5.6)
WBC # BLD AUTO: 8.24 K/UL (ref 3.9–12.7)

## 2024-07-28 PROCEDURE — 80143 DRUG ASSAY ACETAMINOPHEN: CPT | Performed by: STUDENT IN AN ORGANIZED HEALTH CARE EDUCATION/TRAINING PROGRAM

## 2024-07-28 PROCEDURE — 99285 EMERGENCY DEPT VISIT HI MDM: CPT

## 2024-07-28 PROCEDURE — 82077 ASSAY SPEC XCP UR&BREATH IA: CPT | Performed by: STUDENT IN AN ORGANIZED HEALTH CARE EDUCATION/TRAINING PROGRAM

## 2024-07-28 PROCEDURE — 80179 DRUG ASSAY SALICYLATE: CPT | Performed by: STUDENT IN AN ORGANIZED HEALTH CARE EDUCATION/TRAINING PROGRAM

## 2024-07-28 PROCEDURE — 81025 URINE PREGNANCY TEST: CPT | Performed by: STUDENT IN AN ORGANIZED HEALTH CARE EDUCATION/TRAINING PROGRAM

## 2024-07-28 PROCEDURE — U0002 COVID-19 LAB TEST NON-CDC: HCPCS | Performed by: STUDENT IN AN ORGANIZED HEALTH CARE EDUCATION/TRAINING PROGRAM

## 2024-07-28 PROCEDURE — G0425 INPT/ED TELECONSULT30: HCPCS | Mod: GT,,, | Performed by: PSYCHIATRY & NEUROLOGY

## 2024-07-28 PROCEDURE — 85025 COMPLETE CBC W/AUTO DIFF WBC: CPT | Performed by: STUDENT IN AN ORGANIZED HEALTH CARE EDUCATION/TRAINING PROGRAM

## 2024-07-28 PROCEDURE — 80053 COMPREHEN METABOLIC PANEL: CPT | Performed by: STUDENT IN AN ORGANIZED HEALTH CARE EDUCATION/TRAINING PROGRAM

## 2024-07-28 PROCEDURE — 84443 ASSAY THYROID STIM HORMONE: CPT | Performed by: STUDENT IN AN ORGANIZED HEALTH CARE EDUCATION/TRAINING PROGRAM

## 2024-07-29 VITALS
BODY MASS INDEX: 65.78 KG/M2 | SYSTOLIC BLOOD PRESSURE: 146 MMHG | TEMPERATURE: 98 F | HEART RATE: 70 BPM | RESPIRATION RATE: 18 BRPM | OXYGEN SATURATION: 98 % | WEIGHT: 293 LBS | DIASTOLIC BLOOD PRESSURE: 87 MMHG

## 2024-07-29 LAB
AMPHET+METHAMPHET UR QL: NEGATIVE
B-HCG UR QL: NEGATIVE
BARBITURATES UR QL SCN>200 NG/ML: NEGATIVE
BENZODIAZ UR QL SCN>200 NG/ML: NEGATIVE
BILIRUB UR QL STRIP: NEGATIVE
BZE UR QL SCN: NEGATIVE
CANNABINOIDS UR QL SCN: NEGATIVE
CLARITY UR: ABNORMAL
COLOR UR: YELLOW
CREAT UR-MCNC: 273.4 MG/DL (ref 15–325)
CTP QC/QA: YES
GLUCOSE UR QL STRIP: NEGATIVE
HGB UR QL STRIP: NEGATIVE
KETONES UR QL STRIP: NEGATIVE
LEUKOCYTE ESTERASE UR QL STRIP: NEGATIVE
NITRITE UR QL STRIP: NEGATIVE
OPIATES UR QL SCN: NEGATIVE
PCP UR QL SCN>25 NG/ML: NEGATIVE
PH UR STRIP: 6 [PH] (ref 5–8)
PROT UR QL STRIP: ABNORMAL
SP GR UR STRIP: 1.02 (ref 1–1.03)
TOXICOLOGY INFORMATION: NORMAL
URN SPEC COLLECT METH UR: ABNORMAL
UROBILINOGEN UR STRIP-ACNC: NEGATIVE EU/DL

## 2024-07-29 PROCEDURE — 80307 DRUG TEST PRSMV CHEM ANLYZR: CPT | Performed by: STUDENT IN AN ORGANIZED HEALTH CARE EDUCATION/TRAINING PROGRAM

## 2024-07-29 PROCEDURE — 81003 URINALYSIS AUTO W/O SCOPE: CPT | Performed by: STUDENT IN AN ORGANIZED HEALTH CARE EDUCATION/TRAINING PROGRAM

## 2024-07-29 NOTE — ED PROVIDER NOTES
"Encounter Date: 7/28/2024       History     Chief Complaint   Patient presents with    Suicidal     States has been without her medication for 2 months and is unable to afford it. States " I dont want to live the way I am anymore. I don't care if I live or die. I do not have a plan to kill myself"     HPI    38-year-old woman with a history of bipolar depression thyroid disease presents for evaluation of suicidal ideation.  She does not have an active plan to harm herself.  She has not tried to harm herself.  She says she has been out of her medicines that she does not want to live this way anymore.  She does not feel safe because of these feelings.  She denies any acute physical complaints.  She takes Effexor and Latuda.  Review of patient's allergies indicates:   Allergen Reactions    Amoxil [amoxicillin]      Past Medical History:   Diagnosis Date    Anxiety     Bipolar 1 disorder, depressed, severe     Depression     Diabetes mellitus     "pre diabetes"    Insomnia     Thyroid disease      Past Surgical History:   Procedure Laterality Date    ANKLE DEBRIDEMENT Right 01/26/2018    irrigation and debridement    ANKLE FRACTURE SURGERY      CYST REMOVAL      pt states she had a cyst removed from base of her brain when she was 13    ORIF FIBULA FRACTURE Right 12/14/2017     No family history on file.  Social History     Tobacco Use    Smoking status: Every Day     Current packs/day: 0.50     Types: Cigarettes     Passive exposure: Current    Smokeless tobacco: Never   Substance Use Topics    Alcohol use: Yes     Comment: socially    Drug use: Not Currently     Types: Methamphetamines     Review of Systems   All other systems reviewed and are negative.      Physical Exam     Initial Vitals [07/28/24 2121]   BP Pulse Resp Temp SpO2   (!) 136/113 90 18 98.5 °F (36.9 °C) 98 %      MAP       --         Physical Exam    Nursing note and vitals reviewed.  Constitutional: She appears well-developed. She is not diaphoretic. "   HENT:   Head: Normocephalic and atraumatic.   Eyes: Right eye exhibits no discharge. Left eye exhibits no discharge.   Neck: No tracheal deviation present.   Cardiovascular:  Normal rate and regular rhythm.           Pulmonary/Chest: Breath sounds normal. No stridor. No respiratory distress.   Abdominal: Abdomen is soft. There is no abdominal tenderness.   Musculoskeletal:         General: No tenderness.     Neurological: She is alert and oriented to person, place, and time.   Skin: Skin is warm and dry.         ED Course   Procedures  Labs Reviewed   CBC W/ AUTO DIFFERENTIAL - Abnormal       Result Value    WBC 8.24      RBC 5.44 (*)     Hemoglobin 15.4      Hematocrit 46.1      MCV 85      MCH 28.3      MCHC 33.4      RDW 14.8 (*)     Platelets 305      MPV 12.2      Immature Granulocytes 0.2      Gran # (ANC) 4.6      Immature Grans (Abs) 0.02      Lymph # 2.6      Mono # 0.7      Eos # 0.2      Baso # 0.08      nRBC 0      Gran % 55.9      Lymph % 32.0      Mono % 8.7      Eosinophil % 2.2      Basophil % 1.0      Differential Method Automated     COMPREHENSIVE METABOLIC PANEL - Abnormal    Sodium 139      Potassium 3.6      Chloride 105      CO2 22 (*)     Glucose 105      BUN 7      Creatinine 0.9      Calcium 8.9      Total Protein 7.4      Albumin 4.4      Total Bilirubin 0.6      Alkaline Phosphatase 68      AST 19      ALT 29      eGFR >60.0      Anion Gap 12     URINALYSIS, REFLEX TO URINE CULTURE - Abnormal    Specimen UA Urine, Clean Catch      Color, UA Yellow      Appearance, UA Hazy (*)     pH, UA 6.0      Specific Gravity, UA 1.020      Protein, UA Trace (*)     Glucose, UA Negative      Ketones, UA Negative      Bilirubin (UA) Negative      Occult Blood UA Negative      Nitrite, UA Negative      Urobilinogen, UA Negative      Leukocytes, UA Negative      Narrative:     Specimen Source->Urine   ACETAMINOPHEN LEVEL - Abnormal    Acetaminophen (Tylenol), Serum <0.1 (*)    SALICYLATE LEVEL - Abnormal     Salicylate Lvl <1.5 (*)    TSH    TSH 3.500     ALCOHOL,MEDICAL (ETHANOL)    Alcohol, Serum <10     SARS-COV-2 RNA AMPLIFICATION, QUAL    SARS-CoV-2 RNA, Amplification, Qual Negative     DRUG SCREEN PANEL, URINE EMERGENCY   POCT URINE PREGNANCY    POC Preg Test, Ur Negative       Acceptable Yes            Imaging Results    None          Medications - No data to display  Medical Decision Making  Suicidal ideation without active plan no acute physical complaints vital signs are fine, on exam she is resting comfortably in bed.  Psych labs ordered, she is common cooperative, plan for medical clearance and psychiatric evaluation.    Amount and/or Complexity of Data Reviewed  External Data Reviewed: notes.  Labs: ordered. Decision-making details documented in ED Course.               ED Course as of 07/29/24 0250   Mon Jul 29, 2024   0013 Psych would like to continue PC [IC]   0238 TSH: 3.500 [IC]   0239 hCG Qualitative, Urine: Negative [IC]   0249 She does not have acute abnormalities on her lab work requiring emergent intervention, medically cleared for psychiatric placement. [IC]      ED Course User Index  [IC] Edin Amin MD       Medically cleared for psychiatry placement: 7/29/2024  2:49 AM                   Clinical Impression:  Final diagnoses:  [R45.851] Suicide ideation (Primary)          ED Disposition Condition    Transfer to Psych Facility Stable          ED Prescriptions    None       Follow-up Information    None          Edin Amin MD  07/29/24 0250

## 2024-07-29 NOTE — CONSULTS
[facility]  Psychiatry  Telepsychiatry Consult Note    Please see previous notes:    Patient agreeable to consultation via telepsychiatry.    Tele-Consultation from Psychiatry started: 7/28/2024 at 1035pm    The chief complaint leading to psychiatric consultation is: suicidal      This consultation was requested by , the Emergency Department attending physician.  The location of the consulting psychiatrist is  [place] .  The patient location is  [facility] EMERGENCY DEPARTMENT   The patient arrived at the ED at: 7/28      Also present with the patient at the time of the consultation: n/a      Patient Identification:   Praveena Rowe is a 38 y.o. female.    Patient information was obtained from patient and past medical records.  Patient presented voluntarily to the Emergency Department ? Unclear      Consults  Consult Start Time: 07/28/2024 22:35 CDT  Consult End Time: 07/28/2024 23:05 CDT        Subjective:     History of Present Illness:  No notes on file   Per ER note  7/28/24  No MD notes in EPIC at time consult request was called to me by [facility] center        ======================================================  Telepsychiatry Assessment (7/28/24)    Pt says she got a ride by police  she says her fiancé called 911  she asked him to call    she says she has been off meds for 2 months  off Effexor, Latuda  also without metformin, thyroid med  not able to afford    says her doc cannot see her for 3 wks  mother rec. that pt return to psych hospital to restart meds    says in past >> she has run out of meds   says her car broke down  could not get xport to MD appts  also has other worries with rent     plans to move in a few weeks  says a friend might be able to help her with xport and meds      ROS  c/o severe depression  Poor sleep  Poor energy  Poor  concentration  Fearful of harming self  Feeling stressed out  passive SI        Current Medications > off of all  meds      Allergies:  amoxicillin          Psychiatric History:       Previous Psychiatric Hospitalizations:  yes  5/31/23  SI, worsening depression on Wellbutrin, abilify      6/1/23 to 6/6/23  SI on antidepressants prior to admission       Previous Medication Trials:  many  Wellbutrin, abilify Remeron, Lamictal, paxil, buspar      Previous Suicide Attempts:  yes, multiple  OD in Jan 2023, Feb 2023      History of Violence:  denies  History of Depression:  denies  History of Jessie:hypomania  History of Auditory/Visual Hallucination: denies  History of Delusions:  denies    Outpatient psychiatrist (current & past):    ? unclear >> sounds like she gets her meds from PCP          Substance Abuse History:    Tobacco  plans to quit, no cig in 3 days    Etoh   denies overuse >> none in 2 to 3 mo    Illicits   hx of methamphetamine    sober that 2 yrs       detox/rehab  none        Legal History: Past charges/incarcerations: No     Family Psychiatric History: deferred    Medical hx  Morbid obesity  HTN  HLD  GERD  ZACH  Hypothyroidism  Pre diabetes  Right ankle fx  B6 deficiency  Vit D deficiency      Surgical Hx  right ankle    pituitary cyst  (  pt  is unclear of age)          labs/Diagnostics  7/28/24  RBC 5.44     A1c 5.8  Covid neg          Social History:  Developmental/Childhood: problems with spelling  Hx of ST for lisp    Education: drop out in gr 13    Employment: on disability x 3 yrs  (for bipolar II)    Relationship status:engaged > just broke up   After 14 yrs    Housing status: stable > plans to move end of Aug   Mil hx  n/a   Access to gun:  denies  [employer]   deferred    rec activities/hobbies  deferred          Psychiatric Mental Status Exam:  Arousal:  alert  Sensorium/Orientation: oriented to person/place/situation  appearance: obese neat  Behavior/Cooperation: wnl  Speech: non pressured, non slurred  Language: fluent  Mood: depressed  Affect: full range  Thought Process: l/l/gd  Thought Content:  "passive SI, denies HI, denies hallucinations  Auditory hallucinations: no   Visual hallucinations: no  Paranoia: no  Delusions: no  Suicidal ideation:  no  Homicidal ideation: no  Attention/Concentration: adequate  Memory:  not tested  Fund of Knowledge: not tested  Abstract reasoning: not tested  Insight:  good  Judgment:  moderate        Past Medical History:   Past Medical History:   Diagnosis Date    Anxiety     Bipolar 1 disorder, depressed, severe     Depression     Diabetes mellitus     "pre diabetes"    Insomnia     Thyroid disease       Laboratory Data:   Labs Reviewed   CBC W/ AUTO DIFFERENTIAL - Abnormal       Result Value    WBC 8.24      RBC 5.44 (*)     Hemoglobin 15.4      Hematocrit 46.1      MCV 85      MCH 28.3      MCHC 33.4      RDW 14.8 (*)     Platelets 305      MPV 12.2      Immature Granulocytes 0.2      Gran # (ANC) 4.6      Immature Grans (Abs) 0.02      Lymph # 2.6      Mono # 0.7      Eos # 0.2      Baso # 0.08      nRBC 0      Gran % 55.9      Lymph % 32.0      Mono % 8.7      Eosinophil % 2.2      Basophil % 1.0      Differential Method Automated     COMPREHENSIVE METABOLIC PANEL - Abnormal    Sodium 139      Potassium 3.6      Chloride 105      CO2 22 (*)     Glucose 105      BUN 7      Creatinine 0.9      Calcium 8.9      Total Protein 7.4      Albumin 4.4      Total Bilirubin 0.6      Alkaline Phosphatase 68      AST 19      ALT 29      eGFR >60.0      Anion Gap 12     TSH    TSH 3.500     SARS-COV-2 RNA AMPLIFICATION, QUAL    SARS-CoV-2 RNA, Amplification, Qual Negative     URINALYSIS, REFLEX TO URINE CULTURE   DRUG SCREEN PANEL, URINE EMERGENCY   ALCOHOL,MEDICAL (ETHANOL)   ACETAMINOPHEN LEVEL   SALICYLATE LEVEL   POCT URINE PREGNANCY       Neurological History:  Seizures: No  Head trauma: No    Allergies:   Review of patient's allergies indicates:   Allergen Reactions    Amoxil [amoxicillin]        Medications in ER: Medications - No data to display    Medications at home:     No " new subjective & objective note has been filed under this hospital service since the last note was generated.      Assessment - Diagnosis - Goals:     39 yo female with unknown duration of psych symptoms but past hx of methamphetamine >> in remission from meth for 2 yrs    Review of EPIC enounters shows that pt has hx of multiple pill Ods,  inpatient psych  Most recent meds were apparently from non psychiatrist    Pt says she ran out of money for any of her outpatient meds     (including BP, thyroid) for past two months  c/o depression features, passive SI  wants inpatient psych to restart whatever med regimen may be most useful    concur with this plan        ===============================  Diagnosis/Impression:   Unspecified mood disorder  Obesity  Hx of prediabetes >> off metformin   Hx of HTN, HLD  hx of hypothyroidism >> off levothyroxine             ===========================================  Rec:     1)legal  PEC for potential DTS given hx of  recurrent SAs by pill OD        2)labs  Complete  UA, UDS  HCG  A1C  CMP  TSH, FT4  B6, B1, folate, B12    Vit D level     3)meds in ER  Ativan 0.5 mg po tid  Melatonin 6 mg qhs        DM, thyroid as needed          4)dispositon  Inpatient psych placement ASAP            Time with patient: 20 mijn      More than 50% of the time was spent counseling/coordinating care    Consulting clinician was informed of the encounter and consult note.    Consultation ended: 7/28/2024 at 1105pm    Lanny Fowler MD   Psychiatry  [facility]

## 2024-10-15 ENCOUNTER — OFFICE VISIT (OUTPATIENT)
Dept: FAMILY MEDICINE | Facility: CLINIC | Age: 39
End: 2024-10-15
Payer: MEDICARE

## 2024-10-15 ENCOUNTER — LAB VISIT (OUTPATIENT)
Dept: LAB | Facility: HOSPITAL | Age: 39
End: 2024-10-15
Attending: DIETITIAN, REGISTERED
Payer: MEDICARE

## 2024-10-15 ENCOUNTER — TELEPHONE (OUTPATIENT)
Dept: FAMILY MEDICINE | Facility: CLINIC | Age: 39
End: 2024-10-15
Payer: MEDICARE

## 2024-10-15 VITALS
OXYGEN SATURATION: 98 % | TEMPERATURE: 98 F | SYSTOLIC BLOOD PRESSURE: 129 MMHG | HEART RATE: 87 BPM | DIASTOLIC BLOOD PRESSURE: 74 MMHG | WEIGHT: 293 LBS | HEIGHT: 66 IN | BODY MASS INDEX: 47.09 KG/M2

## 2024-10-15 DIAGNOSIS — Z79.899 ENCOUNTER FOR LONG-TERM CURRENT USE OF MEDICATION: ICD-10-CM

## 2024-10-15 DIAGNOSIS — E55.9 VITAMIN D DEFICIENCY: ICD-10-CM

## 2024-10-15 DIAGNOSIS — L02.31 ABSCESS OF GLUTEAL CLEFT: ICD-10-CM

## 2024-10-15 DIAGNOSIS — I10 PRIMARY HYPERTENSION: ICD-10-CM

## 2024-10-15 DIAGNOSIS — E88.819 INSULIN RESISTANCE: ICD-10-CM

## 2024-10-15 DIAGNOSIS — E88.810 METABOLIC SYNDROME: ICD-10-CM

## 2024-10-15 DIAGNOSIS — E03.9 ACQUIRED HYPOTHYROIDISM: ICD-10-CM

## 2024-10-15 DIAGNOSIS — R73.03 PREDIABETES: ICD-10-CM

## 2024-10-15 DIAGNOSIS — E78.2 MIXED HYPERLIPIDEMIA: ICD-10-CM

## 2024-10-15 DIAGNOSIS — B99.9 INFECTION: ICD-10-CM

## 2024-10-15 DIAGNOSIS — F31.81 BIPOLAR 2 DISORDER, MAJOR DEPRESSIVE EPISODE: Primary | ICD-10-CM

## 2024-10-15 PROBLEM — F31.32 BIPOLAR AFFECTIVE DISORDER, CURRENTLY DEPRESSED, MODERATE: Status: RESOLVED | Noted: 2024-10-15 | Resolved: 2024-10-15

## 2024-10-15 PROBLEM — F40.00 AGORAPHOBIA: Status: RESOLVED | Noted: 2024-10-15 | Resolved: 2024-10-15

## 2024-10-15 PROBLEM — F43.10 PTSD (POST-TRAUMATIC STRESS DISORDER): Status: ACTIVE | Noted: 2024-10-15

## 2024-10-15 PROBLEM — F40.00 AGORAPHOBIA: Status: ACTIVE | Noted: 2024-10-15

## 2024-10-15 PROBLEM — E66.9 OBESITY WITH BODY MASS INDEX 30 OR GREATER: Status: ACTIVE | Noted: 2024-10-15

## 2024-10-15 PROBLEM — Z87.891 FORMER HEAVY TOBACCO SMOKER: Status: ACTIVE | Noted: 2023-10-04

## 2024-10-15 PROBLEM — G56.00 CARPAL TUNNEL SYNDROME: Status: ACTIVE | Noted: 2024-10-15

## 2024-10-15 PROBLEM — R07.89 NON-CARDIAC CHEST PAIN: Status: RESOLVED | Noted: 2024-03-21 | Resolved: 2024-10-15

## 2024-10-15 PROBLEM — F19.10 SUBSTANCE ABUSE: Status: RESOLVED | Noted: 2024-10-15 | Resolved: 2024-10-15

## 2024-10-15 PROBLEM — Z01.818 PREOP TESTING: Status: RESOLVED | Noted: 2024-03-21 | Resolved: 2024-10-15

## 2024-10-15 PROBLEM — Z86.59 HISTORY OF BIPOLAR DISORDER: Status: RESOLVED | Noted: 2024-03-21 | Resolved: 2024-10-15

## 2024-10-15 PROBLEM — R45.851 SUICIDAL IDEATION: Status: RESOLVED | Noted: 2023-06-03 | Resolved: 2024-10-15

## 2024-10-15 PROBLEM — F31.9 BIPOLAR 1 DISORDER: Status: RESOLVED | Noted: 2023-06-01 | Resolved: 2024-10-15

## 2024-10-15 PROCEDURE — G2211 COMPLEX E/M VISIT ADD ON: HCPCS | Mod: S$GLB,,, | Performed by: DIETITIAN, REGISTERED

## 2024-10-15 PROCEDURE — 3078F DIAST BP <80 MM HG: CPT | Mod: CPTII,S$GLB,, | Performed by: DIETITIAN, REGISTERED

## 2024-10-15 PROCEDURE — 99999 PR PBB SHADOW E&M-EST. PATIENT-LVL IV: CPT | Mod: PBBFAC,,, | Performed by: DIETITIAN, REGISTERED

## 2024-10-15 PROCEDURE — 83036 HEMOGLOBIN GLYCOSYLATED A1C: CPT | Performed by: DIETITIAN, REGISTERED

## 2024-10-15 PROCEDURE — 85025 COMPLETE CBC W/AUTO DIFF WBC: CPT | Performed by: DIETITIAN, REGISTERED

## 2024-10-15 PROCEDURE — 84443 ASSAY THYROID STIM HORMONE: CPT | Performed by: DIETITIAN, REGISTERED

## 2024-10-15 PROCEDURE — 99204 OFFICE O/P NEW MOD 45 MIN: CPT | Mod: S$GLB,,, | Performed by: DIETITIAN, REGISTERED

## 2024-10-15 PROCEDURE — 1160F RVW MEDS BY RX/DR IN RCRD: CPT | Mod: CPTII,S$GLB,, | Performed by: DIETITIAN, REGISTERED

## 2024-10-15 PROCEDURE — 1159F MED LIST DOCD IN RCRD: CPT | Mod: CPTII,S$GLB,, | Performed by: DIETITIAN, REGISTERED

## 2024-10-15 PROCEDURE — 84439 ASSAY OF FREE THYROXINE: CPT | Performed by: DIETITIAN, REGISTERED

## 2024-10-15 PROCEDURE — 82306 VITAMIN D 25 HYDROXY: CPT | Performed by: DIETITIAN, REGISTERED

## 2024-10-15 PROCEDURE — 82043 UR ALBUMIN QUANTITATIVE: CPT | Performed by: DIETITIAN, REGISTERED

## 2024-10-15 PROCEDURE — 80061 LIPID PANEL: CPT | Performed by: DIETITIAN, REGISTERED

## 2024-10-15 PROCEDURE — 3008F BODY MASS INDEX DOCD: CPT | Mod: CPTII,S$GLB,, | Performed by: DIETITIAN, REGISTERED

## 2024-10-15 PROCEDURE — 80053 COMPREHEN METABOLIC PANEL: CPT | Performed by: DIETITIAN, REGISTERED

## 2024-10-15 PROCEDURE — 3074F SYST BP LT 130 MM HG: CPT | Mod: CPTII,S$GLB,, | Performed by: DIETITIAN, REGISTERED

## 2024-10-15 PROCEDURE — 82607 VITAMIN B-12: CPT | Performed by: DIETITIAN, REGISTERED

## 2024-10-15 PROCEDURE — 36415 COLL VENOUS BLD VENIPUNCTURE: CPT | Mod: PO | Performed by: DIETITIAN, REGISTERED

## 2024-10-15 PROCEDURE — 3044F HG A1C LEVEL LT 7.0%: CPT | Mod: CPTII,S$GLB,, | Performed by: DIETITIAN, REGISTERED

## 2024-10-15 PROCEDURE — 82570 ASSAY OF URINE CREATININE: CPT | Performed by: DIETITIAN, REGISTERED

## 2024-10-15 RX ORDER — LEVOTHYROXINE SODIUM 25 UG/1
25 TABLET ORAL
Qty: 90 TABLET | Refills: 3 | Status: SHIPPED | OUTPATIENT
Start: 2024-10-15

## 2024-10-15 RX ORDER — AMLODIPINE BESYLATE 5 MG/1
5 TABLET ORAL DAILY
Qty: 90 TABLET | Refills: 3 | Status: SHIPPED | OUTPATIENT
Start: 2024-10-15 | End: 2024-10-15

## 2024-10-15 RX ORDER — ATORVASTATIN CALCIUM 20 MG/1
20 TABLET, FILM COATED ORAL NIGHTLY
Qty: 90 TABLET | Refills: 3 | Status: SHIPPED | OUTPATIENT
Start: 2024-10-15

## 2024-10-15 RX ORDER — TIRZEPATIDE 2.5 MG/.5ML
2.5 INJECTION, SOLUTION SUBCUTANEOUS
Qty: 2 ML | Refills: 0 | Status: SHIPPED | OUTPATIENT
Start: 2024-10-15

## 2024-10-15 RX ORDER — MUPIROCIN 20 MG/G
OINTMENT TOPICAL
COMMUNITY
Start: 2024-08-02 | End: 2024-10-15 | Stop reason: SDUPTHER

## 2024-10-15 RX ORDER — SULFAMETHOXAZOLE AND TRIMETHOPRIM 800; 160 MG/1; MG/1
1 TABLET ORAL EVERY 12 HOURS
COMMUNITY

## 2024-10-15 RX ORDER — METFORMIN HYDROCHLORIDE 500 MG/1
500 TABLET ORAL 2 TIMES DAILY WITH MEALS
Qty: 180 TABLET | Refills: 3 | Status: SHIPPED | OUTPATIENT
Start: 2024-10-15

## 2024-10-15 RX ORDER — VENLAFAXINE HYDROCHLORIDE 150 MG/1
150 CAPSULE, EXTENDED RELEASE ORAL DAILY
Qty: 30 CAPSULE | Refills: 0 | Status: SHIPPED | OUTPATIENT
Start: 2024-10-15

## 2024-10-15 RX ORDER — AMLODIPINE BESYLATE 5 MG/1
5 TABLET ORAL DAILY
Qty: 90 TABLET | Refills: 3 | Status: SHIPPED | OUTPATIENT
Start: 2024-10-15 | End: 2025-10-15

## 2024-10-15 RX ORDER — TRAZODONE HYDROCHLORIDE 100 MG/1
100 TABLET ORAL NIGHTLY
COMMUNITY
Start: 2024-10-04

## 2024-10-15 RX ORDER — ERGOCALCIFEROL 1.25 MG/1
50000 CAPSULE ORAL
Qty: 12 CAPSULE | Refills: 3 | Status: SHIPPED | OUTPATIENT
Start: 2024-10-15

## 2024-10-15 RX ORDER — MUPIROCIN 20 MG/G
OINTMENT TOPICAL DAILY
Qty: 15 G | Refills: 2 | Status: SHIPPED | OUTPATIENT
Start: 2024-10-15

## 2024-10-15 RX ORDER — LURASIDONE HYDROCHLORIDE 80 MG/1
80 TABLET, FILM COATED ORAL DAILY
COMMUNITY
Start: 2024-09-27

## 2024-10-15 NOTE — ASSESSMENT & PLAN NOTE
Synthroid dose 25 mcg now  TSH  Free T4  May need to go back up to previous dose of 50 mcg  Lab Results   Component Value Date    TSH 3.500 07/28/2024

## 2024-10-15 NOTE — PATIENT INSTRUCTIONS
Bg Grissom,     If you are due for any health screening(s) below please notify me so we can arrange them to be ordered and scheduled. Most healthy patients at your age complete them, but you are free to accept or refuse.     If you can't do it, I'll definitely understand. If you can, I'd certainly appreciate it!    Tests to Keep You Healthy    Cervical Cancer Screening: DUE  Last Blood Pressure <= 139/89 ( ): NO      Your cervical cancer screening is due     Our records indicate that you may be overdue for your screening Pap smear. A Pap smear is an important health screening that can detect abnormal cells that can become cervical cancer. Cervical cancer screenings allow for early diagnosis and increase the likelihood of successful treatment.     The current recommendation for Pap smear screening is every 3-5 years for women at average risk. We encourage you to schedule your appointment with your [facility] provider. Many women see a gynecologist for this screening, but some primary care providers also provide Pap screening.     If you recently had your Pap smear screening performed outside of [facility], please let your health care team know so that they can update your health record.      Were here to help you quit smoking     Our records indicated that you are still smoking. One of the best things you can do for your health is to stop smoking and we are here to help.     Talk with your provider about our Smoking Cessation Program and how we can support you on your journey.

## 2024-10-15 NOTE — ASSESSMENT & PLAN NOTE
Recurrent abscess, has been treated previously and has persisted.  - Referral to General surgery for evaluation of recurrent abscess, currently still draining purulent material  - Continue Bactrim and mupirocin

## 2024-10-15 NOTE — ASSESSMENT & PLAN NOTE
Following with psych, continue Latuda and Effexor, provided 1 month of Effexor, has been out for 2 days and is having withdrawal symptoms. Latuda, unsure of dose. Encouraged patient to reach out to ordering provider to provide appropriate refills.

## 2024-10-15 NOTE — ASSESSMENT & PLAN NOTE
Previously evaluated for bariatric surgery but was unable to do so due to transportation issues  Zepbound 2.5 mg ordered pending insurance approval

## 2024-10-15 NOTE — ASSESSMENT & PLAN NOTE
Elevated BMI, insulin resistance, HTN and prediabetes with elevated WC  - Zepbound 2.5 mg ordered pending insurance approval

## 2024-10-15 NOTE — PROGRESS NOTES
PLAN:    Assessment & Plan    Assessed current medication regimen and made adjustments to improve management of multiple chronic conditions  Evaluated recent weight loss efforts and considered GLP-1 agonist therapy for further weight management  Reviewed recent labs, noting insulin resistance and elevated C-peptide levels  Examined recurring gluteal cleft abscess and determined need for surgical evaluation due to recurrence   Reviewed thyroid medication dosage history and decided to maintain current dose pending lab results    PREDIABETES MANAGEMENT:  - Provided information on relationship between obesity, prediabetes, and type 2 diabetes.  - Recommend considering purchase and use of [employer] continuous glucose monitor to track blood sugar.  - Continued metformin 500 mg twice daily for prediabetes.    WEIGHT MANAGEMENT:  - Praveena to continue current weight loss efforts through dietary changes and chair yoga.  - Patient previous candidate for bariatric surgery but patient lacks transportation to get this done is good candidate for GLP-1 therapy due to prediabetes, HTN, insulin resistance, hyperlipidemia, and metabolic syndrome. Not a good candidate for phentermine due to HTN.    ABSCESS:  - Continue Bactrim for 7 days for abscess treatment.  - Referred to Dr. Gilmar Casiano for surgical evaluation of recurring gluteal cleft abscess.    BIPOLAR 1:  - Currently follows with psychiatry but is out of Latuda and Effexor.  - Refilled Effexor 150 mg daily for 30 days without refills.  - Follow up with Psych regarding Latuda, unsure of dose, needs 2 more days before pharmacy can fill this Rx.    HYPERTENSION:  - Continued amlodipine 5 mg for blood pressure management.    HYPERLIPIDEMIA:  - Continued atorvastatin 20 mg for cholesterol management.    THYROID DISORDER:  - Continued levothyroxine 25 mcg for hypothyroidism.    SKIN INFECTION:  - Continued Bactroban ointment for skin infection.    MEDICATIONS/SUPPLEMENTS:  -  Continued vitamin D weekly (prescription strength).    LABS:  - Ordered CBC, CMP, A1c, lipid panel, microalbumin to creatinine ratio, TSH, free T4, vitamin B12 and vitamin D levels.    FOLLOW UP:  - Follow up in 1 month to discuss weight loss progress and review continuous glucose monitoring results if obtained.  - Complete ordered lab work prior to next appointment.    OTHER INSTRUCTIONS:  - Explained importance of consistent medication adherence for managing chronic conditions.        Problem List Items Addressed This Visit       Infection     Continue mupirocin         Relevant Medications    mupirocin (BACTROBAN) 2 % ointment    Prediabetes     Lab Results   Component Value Date    HGBA1C 5.8 (H) 03/21/2024     Continue Metformin for now  Ordered Zepbound due to insulin resistance, HTN, metabolic syndrome and prediabetes         Relevant Medications    metFORMIN (GLUCOPHAGE) 500 MG tablet    tirzepatide, weight loss, (ZEPBOUND) 2.5 mg/0.5 mL PnIj    Other Relevant Orders    Comprehensive Metabolic Panel    Hemoglobin A1C    Microalbumin/Creatinine Ratio, Urine    Hypothyroidism     Synthroid dose 25 mcg now  TSH  Free T4  May need to go back up to previous dose of 50 mcg  Lab Results   Component Value Date    TSH 3.500 07/28/2024              Relevant Medications    levothyroxine (SYNTHROID) 25 MCG tablet    Other Relevant Orders    TSH    T4, FREE    HLD (hyperlipidemia)     Continue Lipitor  Lipid panel today               Relevant Medications    atorvastatin (LIPITOR) 20 MG tablet    tirzepatide, weight loss, (ZEPBOUND) 2.5 mg/0.5 mL PnIj    Other Relevant Orders    Lipid Panel    Essential hypertension     Continue Norvasc 5 mg  BP controlled today, chronic stable         Relevant Medications    tirzepatide, weight loss, (ZEPBOUND) 2.5 mg/0.5 mL PnIj    amLODIPine (NORVASC) 5 MG tablet    BMI 60.0-69.9, adult     Previously evaluated for bariatric surgery but was unable to do so due to transportation  issues  Zepbound 2.5 mg ordered pending insurance approval         Relevant Medications    tirzepatide, weight loss, (ZEPBOUND) 2.5 mg/0.5 mL PnIj    Bipolar 2 disorder, major depressive episode - Primary     Refilled Effexor today  - Follow up with Psych regarding Latuda         Relevant Medications    venlafaxine (EFFEXOR-XR) 150 MG Cp24    lurasidone (LATUDA) 80 mg Tab tablet    Vitamin D deficiency     H/O Vit D deficiency  - Check Vit D levels today         Relevant Medications    ergocalciferol (ERGOCALCIFEROL) 50,000 unit Cap    Other Relevant Orders    Vitamin D    Metabolic syndrome     Elevated BMI, insulin resistance, HTN and prediabetes with elevated WC  - Zepbound 2.5 mg ordered pending insurance approval         Relevant Medications    tirzepatide, weight loss, (ZEPBOUND) 2.5 mg/0.5 mL PnIj    Insulin resistance     See obesity note and prediabetes note         Abscess of gluteal cleft     Recurrent abscess, has been treated previously and has persisted.  - Referral to General surgery for evaluation of recurrent abscess, currently still draining purulent material  - Continue Bactrim and mupirocin         Relevant Orders    Ambulatory referral/consult to General Surgery     Other Visit Diagnoses       Encounter for long-term current use of medication        Relevant Orders    Vitamin B12          Future Appointments       Date Provider Specialty Appt Notes    11/15/2024 Joanna Hansen, DO Family Medicine 1 month follow up           Medication Management for assessment above:   Medication List with Changes/Refills   New Medications    TIRZEPATIDE, WEIGHT LOSS, (ZEPBOUND) 2.5 MG/0.5 ML PNIJ    Inject 2.5 mg into the skin every 7 days.   Current Medications    LURASIDONE (LATUDA) 80 MG TAB TABLET    Take 80 mg by mouth once daily.    SULFAMETHOXAZOLE-TRIMETHOPRIM 800-160MG (BACTRIM DS) 800-160 MG TAB    Take 1 tablet by mouth every 12 (twelve) hours.    TRAZODONE (DESYREL) 100 MG TABLET    Take 100 mg by  mouth every evening.   Changed and/or Refilled Medications    Modified Medication Previous Medication    AMLODIPINE (NORVASC) 5 MG TABLET amLODIPine (NORVASC) 5 MG tablet       Take 1 tablet (5 mg total) by mouth once daily.    Take 1 tablet (5 mg total) by mouth once daily.    ATORVASTATIN (LIPITOR) 20 MG TABLET atorvastatin (LIPITOR) 20 MG tablet       Take 1 tablet (20 mg total) by mouth every evening.    Take 1 tablet every day by oral route at dinner for 90 days.    ERGOCALCIFEROL (ERGOCALCIFEROL) 50,000 UNIT CAP ergocalciferol (ERGOCALCIFEROL) 50,000 unit Cap       Take 1 capsule (50,000 Units total) by mouth every 7 days.    Take 1 capsule every week by oral route as directed for 28 days.    LEVOTHYROXINE (SYNTHROID) 25 MCG TABLET levothyroxine (SYNTHROID) 25 MCG tablet       Take 1 tablet (25 mcg total) by mouth before breakfast.    Take 1 tablet every day by oral route in the morning for 30 days.    METFORMIN (GLUCOPHAGE) 500 MG TABLET metFORMIN (GLUCOPHAGE) 500 MG tablet       Take 1 tablet (500 mg total) by mouth 2 (two) times daily with meals.    Take 1 tablet twice a day by oral route as directed for 30 days.    MUPIROCIN (BACTROBAN) 2 % OINTMENT mupirocin (BACTROBAN) 2 % ointment       Apply topically once daily.    SMARTSI Application Topical 2-3 Times Daily    VENLAFAXINE (EFFEXOR-XR) 150 MG CP24 venlafaxine (EFFEXOR-XR) 150 MG Cp24       Take 1 capsule (150 mg total) by mouth once daily.       Discontinued Medications    ARIPIPRAZOLE (ABILIFY) 5 MG TAB    Take 1 tablet (5 mg total) by mouth once daily.    ASPIRIN 325 MG TABLET    Take 1 tablet (325 mg total) by mouth 2 (two) times daily.    BUPROPION (WELLBUTRIN XL) 300 MG 24 HR TABLET    Take 1 tablet (300 mg total) by mouth once daily.    HYDROCODONE-ACETAMINOPHEN (NORCO) 5-325 MG PER TABLET    Take 1 tablet by mouth every 6 (six) hours as needed for Pain.    HYDROXYZINE (ATARAX) 50 MG TABLET        LURASIDONE (LATUDA) 20 MG TAB TABLET         "METFORMIN (GLUCOPHAGE) 500 MG TABLET    Take 1 tablet (500 mg total) by mouth 2 (two) times daily with meals.    ONDANSETRON (ZOFRAN-ODT) 4 MG TBDL    Take 1 tablet (4 mg total) by mouth every 6 (six) hours as needed.    SIMVASTATIN (ZOCOR) 10 MG TABLET    Take 10 mg by mouth every evening.       Joanna Hansen, DO, RDN  ==========================================================================  Subjective:   Patient ID: Praveena Rowe is a 39 y.o. female.  has a past medical history of Agoraphobia (10/15/2024), Anxiety, Bipolar 1 disorder, depressed, severe, Bipolar affective disorder, currently depressed, moderate (10/15/2024), Depression, Diabetes mellitus, History of bipolar disorder (03/21/2024), Insomnia, Non-cardiac chest pain (03/21/2024), Preop testing (03/21/2024), Substance abuse (10/15/2024), Suicidal ideation (06/03/2023), and Thyroid disease.   Chief Complaint: Establish Care      History of Present Illness    CHIEF COMPLAINT:  Praveena presents today to establish care and manage ongoing medical conditions.    PSYCHIATRIC HISTORY:  She has a history of bipolar disorder, depression, PTSD, and agoraphobia. She reports current depressive symptoms including lack of interest in activities and difficulty focusing. She feels "pretty good" overall but acknowledges feeling down when bored. Her agoraphobia has improved significantly; she was previously unable to leave her house for two years but can now go out despite experiencing anxiety.    MEDICATIONS:  Current medications include levothyroxine 25 mcg, metformin, venlafaxine (Effexor), amlodipine 5 mg, atorvastatin 20 mg, weekly prescription vitamin D, Latuda, Bactroban (oral and topical), and trazodone. She is out of Effexor until the 17th and has noticed a difference but denies severe depression. She expresses fear of taking trazodone and is not currently using it. She has three months of refills for most medications. She reports discontinuation of Abilify, " aspirin, Wellbutrin 300 mg, Norco, Atarax, Zofran, and simvastatin. She acknowledges a history of medication non-adherence, reporting feeling unwell during those periods.    MEDICAL HISTORY:  She has a history of hypothyroidism, prediabetes, vitamin D deficiency, carpal tunnel syndrome, hypertension, hyperlipidemia, sleep apnea, obesity, and insulin resistance. She is awaiting a CPAP after completing a sleep study last month, with an appointment scheduled for the 21st to obtain the device. She reports significant weight loss from 412 lbs to 377 lbs.    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    FAMILY HISTORY:  Her mother and grandmother have diabetes.    SOCIAL HISTORY:  She recently moved to [place] and is living with friends. She receives $900 per month in disability benefits. She is a current smoker, having recently resumed due to stress after a quit attempt.    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 She previously used Adderall for appetite suppression, which led to methamphetamine use.    ALLERGIES:  She reports an allergy to amoxicillin, manifesting as a rash. She manages this by taking Benadryl concurrently with amoxicillin when necessary, which she reports effectively controls the reaction.    RECENT MEDICAL EVENTS:  She recently visited the emergency room due to an infected cyst and was advised to follow up with her PCP.    LIFESTYLE AND DIET:  She reports attempting to reduce food intake due to budget constraints since moving out on her own. She practices chair yoga in the afternoons as a form of exercise.    PREVENTIVE CARE:  Her last Pap smear was performed in January or February 2024 by Dr. Sultana at [facility] in [place].      ROS:  General: -fever, -chills, -fatigue, -weight gain, +weight loss  Eyes: -vision changes, -redness, -discharge  ENT: -ear pain,  -nasal congestion, -sore throat  Cardiovascular: -chest pain, -palpitations, -lower extremity edema  Respiratory: -cough, -shortness of breath  Gastrointestinal: -abdominal pain, -nausea, -vomiting, -diarrhea, -constipation, -blood in stool  Genitourinary: -dysuria, -hematuria, -frequency  Musculoskeletal: -joint pain, -muscle pain  Skin: +rash, -lesion  Neurological: -headache, -dizziness, -numbness, -tingling  Psychiatric: +anxiety, +depression, -sleep difficulty          Problem List Items Addressed This Visit       Infection    Current Assessment & Plan     Continue mupirocin         Prediabetes    Current Assessment & Plan     Lab Results   Component Value Date    HGBA1C 5.8 (H) 03/21/2024     Continue Metformin for now  Ordered Zepbound due to insulin resistance, HTN, metabolic syndrome and prediabetes         Hypothyroidism    Current Assessment & Plan     Synthroid dose 25 mcg now  TSH  Free T4  May need to go back up to previous dose of 50 mcg  Lab Results   Component Value Date    TSH 3.500 07/28/2024              HLD (hyperlipidemia)    Current Assessment & Plan     Continue Lipitor  Lipid panel today               Essential hypertension    Current Assessment & Plan     Continue Norvasc 5 mg  BP controlled today, chronic stable         BMI 60.0-69.9, adult    Current Assessment & Plan     Previously evaluated for bariatric surgery but was unable to do so due to transportation issues  Zepbound 2.5 mg ordered pending insurance approval         Bipolar 2 disorder, major depressive episode - Primary    Overview     Following with psych, continue Latuda and Effexor, provided 1 month of Effexor, has been out for 2 days and is having withdrawal symptoms. Latuda, unsure of dose. Encouraged patient to reach out to ordering provider to provide appropriate refills.         Current Assessment & Plan     Refilled Effexor today  - Follow up with Psych regarding Latuda         Vitamin D deficiency    Current Assessment  "& Plan     H/O Vit D deficiency  - Check Vit D levels today         Metabolic syndrome    Current Assessment & Plan     Elevated BMI, insulin resistance, HTN and prediabetes with elevated WC  - Zepbound 2.5 mg ordered pending insurance approval         Insulin resistance    Current Assessment & Plan     See obesity note and prediabetes note         Abscess of gluteal cleft    Current Assessment & Plan     Recurrent abscess, has been treated previously and has persisted.  - Referral to General surgery for evaluation of recurrent abscess, currently still draining purulent material  - Continue Bactrim and mupirocin          Other Visit Diagnoses       Encounter for long-term current use of medication                 Review of patient's allergies indicates:   Allergen Reactions    Amoxicillin Hives     Current Outpatient Medications   Medication Instructions    amLODIPine (NORVASC) 5 mg, Oral, Daily    atorvastatin (LIPITOR) 20 mg, Oral, Nightly    ergocalciferol (ERGOCALCIFEROL) 50,000 Units, Oral, Every 7 days    levothyroxine (SYNTHROID) 25 mcg, Oral, Before breakfast    lurasidone (LATUDA) 80 mg, Daily    metFORMIN (GLUCOPHAGE) 500 mg, Oral, 2 times daily with meals    mupirocin (BACTROBAN) 2 % ointment Topical (Top), Daily    sulfamethoxazole-trimethoprim 800-160mg (BACTRIM DS) 800-160 mg Tab 1 tablet, Every 12 hours    traZODone (DESYREL) 100 mg, Nightly    venlafaxine (EFFEXOR-XR) 150 mg, Oral, Daily    ZEPBOUND 2.5 mg, Subcutaneous, Every 7 days      I have reviewed the PMH, social history, FamilyHx, surgical history, allergies and medications documented / confirmed by the patient at the time of this visit.      Objective:   /74   Pulse 87   Temp 97.9 °F (36.6 °C)   Ht 5' 6" (1.676 m)   Wt (!) 171.2 kg (377 lb 6.4 oz)   LMP 09/04/2024 (Approximate)   SpO2 98%   BMI 60.91 kg/m²     Physical Exam    Vitals: Weight: 377 lbs. BMI: 60.9.  General: No acute distress. Well-developed. Well-nourished. Morbid " obesity.  Eyes: EOMI. Sclerae anicteric.  HENT: Normocephalic. Atraumatic. Nares patent. Moist oral mucosa.  Ears: Bilateral TMs clear. Bilateral EACs clear.  Cardiovascular: Regular rate. Regular rhythm. No murmurs. No rubs. No gallops. Normal S1, S2.  Respiratory: Diminished bilaterally. Clear to auscultation bilaterally. No rales. No rhonchi. No wheezing.  Abdomen: Soft. Non-tender. Non-distended. Normoactive bowel sounds.  Musculoskeletal: No  obvious deformity.  Extremities: No lower extremity edema.  Neurological: Alert & oriented x3. No slurred speech. Normal gait.  Psychiatric: Normal mood. Normal affect. Good insight. Good judgment.  Skin: Warm. Dry. No rash. Indurated gluteal cleft abscess pin point opening draining purulent material, minimal to no fluctuance.          Assessment:     1. Bipolar 2 disorder, major depressive episode    2. Prediabetes    3. Abscess of gluteal cleft    4. Infection    5. Acquired hypothyroidism    6. Vitamin D deficiency    7. Mixed hyperlipidemia    8. Primary hypertension    9. BMI 60.0-69.9, adult    10. Metabolic syndrome    11. Insulin resistance    12. Encounter for long-term current use of medication      MDM:   Moderate complexity, multiple chronic medical conditions requiring medication management.   Visit today included increased complexity associated with the care of the episodic problem see above assessment addressed and managing the longitudinal care of the patient due to the serious and/or complex managed problem(s) see above.    I have reviewed and summarized old records.  I have performed thorough medication reconciliation today and discussed risk and benefits of medications.  I have reviewed labs and discussed with patient.  All questions were answered.    I have signed for the following orders AND/OR meds.  Orders Placed This Encounter   Procedures    CBC Auto Differential     Standing Status:   Future     Number of Occurrences:   1     Standing Expiration  Date:   12/14/2025    Comprehensive Metabolic Panel     Standing Status:   Future     Number of Occurrences:   1     Standing Expiration Date:   10/15/2025    Hemoglobin A1C     Standing Status:   Future     Number of Occurrences:   1     Standing Expiration Date:   10/15/2025    Lipid Panel     Standing Status:   Future     Number of Occurrences:   1     Standing Expiration Date:   10/15/2025    TSH     Standing Status:   Future     Number of Occurrences:   1     Standing Expiration Date:   10/15/2025    Vitamin D     Standing Status:   Future     Number of Occurrences:   1     Standing Expiration Date:   12/14/2025    Vitamin B12     Standing Status:   Future     Number of Occurrences:   1     Standing Expiration Date:   12/14/2025    Microalbumin/Creatinine Ratio, Urine     Standing Status:   Future     Number of Occurrences:   1     Standing Expiration Date:   10/15/2025     Order Specific Question:   Specimen Source     Answer:   Urine    T4, FREE     Standing Status:   Future     Number of Occurrences:   1     Standing Expiration Date:   1/13/2026    Ambulatory referral/consult to General Surgery     Standing Status:   Future     Standing Expiration Date:   11/15/2025     Referral Priority:   Routine     Referral Type:   Consultation     Referral Reason:   Specialty Services Required     Referred to Provider:   Gilmar Marin MD     Requested Specialty:   General Surgery     Number of Visits Requested:   1     Medications Ordered This Encounter   Medications    amLODIPine (NORVASC) 5 MG tablet     Sig: Take 1 tablet (5 mg total) by mouth once daily.     Dispense:  90 tablet     Refill:  3     .    atorvastatin (LIPITOR) 20 MG tablet     Sig: Take 1 tablet (20 mg total) by mouth every evening.     Dispense:  90 tablet     Refill:  3    ergocalciferol (ERGOCALCIFEROL) 50,000 unit Cap     Sig: Take 1 capsule (50,000 Units total) by mouth every 7 days.     Dispense:  12 capsule     Refill:  3    levothyroxine  (SYNTHROID) 25 MCG tablet     Sig: Take 1 tablet (25 mcg total) by mouth before breakfast.     Dispense:  90 tablet     Refill:  3    metFORMIN (GLUCOPHAGE) 500 MG tablet     Sig: Take 1 tablet (500 mg total) by mouth 2 (two) times daily with meals.     Dispense:  180 tablet     Refill:  3    mupirocin (BACTROBAN) 2 % ointment     Sig: Apply topically once daily.     Dispense:  15 g     Refill:  2    tirzepatide, weight loss, (ZEPBOUND) 2.5 mg/0.5 mL PnIj     Sig: Inject 2.5 mg into the skin every 7 days.     Dispense:  2 mL     Refill:  0    venlafaxine (EFFEXOR-XR) 150 MG Cp24     Sig: Take 1 capsule (150 mg total) by mouth once daily.     Dispense:  30 capsule     Refill:  0        Follow up in about 1 month (around 11/15/2024) for obesity follow up for weight loss.  Future Appointments       Date Provider Specialty Appt Notes    11/15/2024 Joanna Hansen DO Family Medicine 1 month follow up            If no improvement in symptoms or symptoms worsen, advised to call/follow-up at clinic or go to ER. Patient voiced understanding and all questions/concerns were addressed.     DISCLAIMER: This note was compiled by using a speech recognition dictation system and therefore please be aware that typographical / speech recognition errors can and do occur.  Please contact me if you see any errors specifically.     This note was generated with the assistance of ambient listening technology. Verbal consent was obtained by the patient and accompanying visitor(s) for the recording of patient appointment to facilitate this note. I attest to having reviewed and edited the generated note for accuracy, though some syntax or spelling errors may persist. Please contact the author of this note for any clarification.      Joanna Hansen DO, RDN    Office: [phone]   [id number] [place] [address]  FAX: [phone]

## 2024-10-15 NOTE — ASSESSMENT & PLAN NOTE
Lab Results   Component Value Date    HGBA1C 5.8 (H) 03/21/2024     Continue Metformin for now  Ordered Zepbound due to insulin resistance, HTN, metabolic syndrome and prediabetes

## 2024-10-16 DIAGNOSIS — E53.8 B12 DEFICIENCY: Primary | ICD-10-CM

## 2024-10-16 LAB
25(OH)D3+25(OH)D2 SERPL-MCNC: 20 NG/ML (ref 30–96)
ALBUMIN SERPL BCP-MCNC: 4.1 G/DL (ref 3.5–5.2)
ALBUMIN/CREAT UR: 13.4 UG/MG (ref 0–30)
ALP SERPL-CCNC: 82 U/L (ref 55–135)
ALT SERPL W/O P-5'-P-CCNC: 22 U/L (ref 10–44)
ANION GAP SERPL CALC-SCNC: 13 MMOL/L (ref 8–16)
AST SERPL-CCNC: 18 U/L (ref 10–40)
BASOPHILS # BLD AUTO: 0.08 K/UL (ref 0–0.2)
BASOPHILS NFR BLD: 1 % (ref 0–1.9)
BILIRUB SERPL-MCNC: 0.8 MG/DL (ref 0.1–1)
BUN SERPL-MCNC: 10 MG/DL (ref 6–20)
CALCIUM SERPL-MCNC: 9.5 MG/DL (ref 8.7–10.5)
CHLORIDE SERPL-SCNC: 103 MMOL/L (ref 95–110)
CHOLEST SERPL-MCNC: 154 MG/DL (ref 120–199)
CHOLEST/HDLC SERPL: 2.9 {RATIO} (ref 2–5)
CO2 SERPL-SCNC: 24 MMOL/L (ref 23–29)
CREAT SERPL-MCNC: 0.9 MG/DL (ref 0.5–1.4)
CREAT UR-MCNC: 292 MG/DL (ref 15–325)
DIFFERENTIAL METHOD BLD: ABNORMAL
EOSINOPHIL # BLD AUTO: 0.3 K/UL (ref 0–0.5)
EOSINOPHIL NFR BLD: 3.4 % (ref 0–8)
ERYTHROCYTE [DISTWIDTH] IN BLOOD BY AUTOMATED COUNT: 14.2 % (ref 11.5–14.5)
EST. GFR  (NO RACE VARIABLE): >60 ML/MIN/1.73 M^2
ESTIMATED AVG GLUCOSE: 103 MG/DL (ref 68–131)
GLUCOSE SERPL-MCNC: 85 MG/DL (ref 70–110)
HBA1C MFR BLD: 5.2 % (ref 4–5.6)
HCT VFR BLD AUTO: 44.8 % (ref 37–48.5)
HDLC SERPL-MCNC: 54 MG/DL (ref 40–75)
HDLC SERPL: 35.1 % (ref 20–50)
HGB BLD-MCNC: 14.3 G/DL (ref 12–16)
IMM GRANULOCYTES # BLD AUTO: 0.02 K/UL (ref 0–0.04)
IMM GRANULOCYTES NFR BLD AUTO: 0.3 % (ref 0–0.5)
LDLC SERPL CALC-MCNC: 84.4 MG/DL (ref 63–159)
LYMPHOCYTES # BLD AUTO: 2.3 K/UL (ref 1–4.8)
LYMPHOCYTES NFR BLD: 28.7 % (ref 18–48)
MCH RBC QN AUTO: 28 PG (ref 27–31)
MCHC RBC AUTO-ENTMCNC: 31.9 G/DL (ref 32–36)
MCV RBC AUTO: 88 FL (ref 82–98)
MICROALBUMIN UR DL<=1MG/L-MCNC: 39 UG/ML
MONOCYTES # BLD AUTO: 0.7 K/UL (ref 0.3–1)
MONOCYTES NFR BLD: 9 % (ref 4–15)
NEUTROPHILS # BLD AUTO: 4.6 K/UL (ref 1.8–7.7)
NEUTROPHILS NFR BLD: 57.6 % (ref 38–73)
NONHDLC SERPL-MCNC: 100 MG/DL
NRBC BLD-RTO: 0 /100 WBC
PLATELET # BLD AUTO: 283 K/UL (ref 150–450)
PMV BLD AUTO: 13.6 FL (ref 9.2–12.9)
POTASSIUM SERPL-SCNC: 4.3 MMOL/L (ref 3.5–5.1)
PROT SERPL-MCNC: 7.4 G/DL (ref 6–8.4)
RBC # BLD AUTO: 5.11 M/UL (ref 4–5.4)
SODIUM SERPL-SCNC: 140 MMOL/L (ref 136–145)
T4 FREE SERPL-MCNC: 0.89 NG/DL (ref 0.71–1.51)
TRIGL SERPL-MCNC: 78 MG/DL (ref 30–150)
TSH SERPL DL<=0.005 MIU/L-ACNC: 1.82 UIU/ML (ref 0.4–4)
VIT B12 SERPL-MCNC: 235 PG/ML (ref 210–950)
WBC # BLD AUTO: 7.98 K/UL (ref 3.9–12.7)

## 2024-10-16 RX ORDER — MAGNESIUM 200 MG
1000 TABLET ORAL DAILY
Qty: 30 TABLET | Refills: 3 | Status: SHIPPED | OUTPATIENT
Start: 2024-10-16

## 2024-11-15 ENCOUNTER — OFFICE VISIT (OUTPATIENT)
Dept: FAMILY MEDICINE | Facility: CLINIC | Age: 39
End: 2024-11-15
Payer: MEDICARE

## 2024-11-15 VITALS
HEART RATE: 112 BPM | HEIGHT: 66 IN | WEIGHT: 293 LBS | OXYGEN SATURATION: 97 % | TEMPERATURE: 98 F | BODY MASS INDEX: 47.09 KG/M2 | SYSTOLIC BLOOD PRESSURE: 124 MMHG | DIASTOLIC BLOOD PRESSURE: 80 MMHG

## 2024-11-15 DIAGNOSIS — F31.81 BIPOLAR 2 DISORDER, MAJOR DEPRESSIVE EPISODE: Primary | ICD-10-CM

## 2024-11-15 DIAGNOSIS — R73.03 PREDIABETES: ICD-10-CM

## 2024-11-15 PROCEDURE — 99999 PR PBB SHADOW E&M-EST. PATIENT-LVL IV: CPT | Mod: PBBFAC,,, | Performed by: DIETITIAN, REGISTERED

## 2024-11-15 RX ORDER — SEMAGLUTIDE 0.25 MG/.5ML
0.25 INJECTION, SOLUTION SUBCUTANEOUS
Qty: 2 ML | Refills: 0 | Status: SHIPPED | OUTPATIENT
Start: 2024-11-15

## 2024-11-15 NOTE — PROGRESS NOTES
PLAN:    Assessment & Plan    Considered weight loss options for pre-diabetic patient with elevated blood pressure  Initiated Ozempic (semaglutide) for weight management, recognizing limitations due to pre-diabetic status  Evaluated alternative weight loss medications, ruled out Contrave due to existing antidepressant regimen and Qsymia due to blood pressure concerns  Continued current medications including Metformin, Synthroid, Latuda, Effexor, Trazodone, Vitamin D, B12, Lipitor, and Norvasc  Reviewed recent lab results, noting improvement in TSH levels with Synthroid  Assessed patient's significant weight loss progress (60 lbs since July)  Considered bariatric surgery as potential future option if medication management unsuccessful    WEIGHT MANAGEMENT:  Discussed various weight loss medication options and their limitations based on patient's current health status.  Started Wegovy (semaglutide) 0.25 mg weekly for weight management.    DEPRESSION AND MENTAL HEALTH:  Emphasized the importance of community involvement and volunteering for mental health.  Patient to get involved in community activities or volunteer work to address feelings of purposelessness and depression.  Recommend exploring local volunteering opportunities in [place].  Referred to therapist for mental health support.    MEDICATION MANAGEMENT:  Continued Metformin, Synthroid, Latuda, Effexor, Trazodone, Vitamin D, B12, Lipitor, and Norvasc.    ADMINISTRATIVE TASKS:  Released of Information (ELBERT) ordered for pap smear records from Dr. Sultana at [facility].  Patient to contact office through [facility] if additional paperwork needs to be completed for medication.    FOLLOW-UP:  Follow up in about a month to discuss Wegovy effectiveness and potential next steps.        Problem List Items Addressed This Visit       Prediabetes    Relevant Medications    semaglutide, weight loss, (WEGOVY) 0.25 mg/0.5 mL PnIj    BMI 60.0-69.9, adult    Relevant  Medications    semaglutide, weight loss, (WEGOVY) 0.25 mg/0.5 mL PnIj    Bipolar 2 disorder, major depressive episode - Primary    Relevant Orders    Ambulatory referral/consult to Psychology        Medication Management for assessment above:   Medication List with Changes/Refills   New Medications    SEMAGLUTIDE, WEIGHT LOSS, (WEGOVY) 0.25 MG/0.5 ML PNIJ    Inject 0.25 mg into the skin every 7 days.   Current Medications    AMLODIPINE (NORVASC) 5 MG TABLET    Take 1 tablet (5 mg total) by mouth once daily.    ATORVASTATIN (LIPITOR) 20 MG TABLET    Take 1 tablet (20 mg total) by mouth every evening.    CYANOCOBALAMIN, VITAMIN B-12, 1,000 MCG SUBL    Place 1,000 mcg under the tongue once daily.    ERGOCALCIFEROL (ERGOCALCIFEROL) 50,000 UNIT CAP    Take 1 capsule (50,000 Units total) by mouth every 7 days.    LEVOTHYROXINE (SYNTHROID) 25 MCG TABLET    Take 1 tablet (25 mcg total) by mouth before breakfast.    LURASIDONE (LATUDA) 80 MG TAB TABLET    Take 80 mg by mouth once daily.    METFORMIN (GLUCOPHAGE) 500 MG TABLET    Take 1 tablet (500 mg total) by mouth 2 (two) times daily with meals.    TRAZODONE (DESYREL) 100 MG TABLET    Take 100 mg by mouth every evening.    VENLAFAXINE (EFFEXOR-XR) 150 MG CP24    Take 1 capsule (150 mg total) by mouth once daily.   Discontinued Medications    MUPIROCIN (BACTROBAN) 2 % OINTMENT    Apply topically once daily.    SULFAMETHOXAZOLE-TRIMETHOPRIM 800-160MG (BACTRIM DS) 800-160 MG TAB    Take 1 tablet by mouth every 12 (twelve) hours.    TIRZEPATIDE, WEIGHT LOSS, (ZEPBOUND) 2.5 MG/0.5 ML PNIJ    Inject 2.5 mg into the skin every 7 days.       Joanna Hansen DO, RDN  ==========================================================================  Subjective:   Patient ID: Praveena Rowe is a 39 y.o. female.  has a past medical history of Agoraphobia (10/15/2024), Anxiety, Bipolar 1 disorder, depressed, severe, Bipolar affective disorder, currently depressed, moderate (10/15/2024),  "Depression, Diabetes mellitus, History of bipolar disorder (03/21/2024), Insomnia, Non-cardiac chest pain (03/21/2024), Preop testing (03/21/2024), Substance abuse (10/15/2024), Suicidal ideation (06/03/2023), and Thyroid disease.   Chief Complaint: Follow-up      History of Present Illness    CHIEF COMPLAINT:  Patient presents for follow-up on weight management and medication adjustments, as well as to discuss mental health concerns.    HPI:  Patient reports significant weight loss, reducing from 420 lbs in July to 371 lbs currently, a total loss of approximately 60 lbs. She expresses a desire to continue weight loss with medical assistance, preferring medication over bariatric surgery at this time. Patient reports depression, particularly due to her living situation and separation from her boyfriend. She describes feelings of anhedonia and hopelessness, spending most of her time at home. Her boyfriend resides in a group home approximately 90 minutes away, which contributes to her depressed mood. She expresses interest in psychiatric or therapeutic intervention for additional support. Patient reports having pre-diabetes. Regarding her medications, she confirms obtaining her Latuda prescription after previous difficulties and reports tolerating the current dose well. Patient also reports taking vitamin B12 daily as prescribed. She denies suicidal ideation and drug allergies, although it is noted she has a recorded allergy to amoxicillin which she manages by taking Benadryl concurrently.    MEDICATIONS:  Patient is on Metformin, Synthroid for thyroid, Latuda, Effexor, and Trazodone. She also takes Vitamin D and B12 daily, as well as Lipitor. For blood pressure management, she is on Norvasc.    MEDICAL HISTORY:  Patient has a history of pre-diabetes, depression, and hypertension. She had her last Pap smear last year with Dr. Sultana at [facility].    TEST RESULTS:  Patient's TSH was reported as "good" by the " "practitioner. All other labs were reported as "everything else came back good".    ALLERGIES:  Patient is allergic to Amoxicillin. She manages the reaction by taking Benadryl with it.    SOCIAL HISTORY:  Patient is single but in a relationship. Her boyfriend lives in a group home.      ROS:  General: -fever, -chills, -fatigue, -weight gain, +weight loss  Eyes: -vision changes, -redness, -discharge  ENT: -ear pain, -nasal congestion, -sore throat  Cardiovascular: -chest pain, -palpitations, -lower extremity edema  Respiratory: -cough, -shortness of breath  Gastrointestinal: -abdominal pain, -nausea, -vomiting, -diarrhea, -constipation, -blood in stool  Genitourinary: -dysuria, -hematuria, -frequency  Musculoskeletal: -joint pain, -muscle pain  Skin: -rash, -lesion  Neurological: -headache, -dizziness, -numbness, -tingling  Psychiatric: -anxiety, +depression, -sleep difficulty, -suicidal ideation  Allergic: -allergic reactions          Problem List Items Addressed This Visit       Prediabetes    BMI 60.0-69.9, adult    Bipolar 2 disorder, major depressive episode - Primary    Overview     Following with psych, continue Latuda and Effexor, provided 1 month of Effexor, has been out for 2 days and is having withdrawal symptoms. Latuda, unsure of dose. Encouraged patient to reach out to ordering provider to provide appropriate refills.             Review of patient's allergies indicates:   Allergen Reactions    Amoxicillin Hives     Current Outpatient Medications   Medication Instructions    amLODIPine (NORVASC) 5 mg, Oral, Daily    atorvastatin (LIPITOR) 20 mg, Oral, Nightly    cyanocobalamin (vitamin B-12) 1,000 mcg, Sublingual, Daily    ergocalciferol (ERGOCALCIFEROL) 50,000 Units, Oral, Every 7 days    levothyroxine (SYNTHROID) 25 mcg, Oral, Before breakfast    lurasidone (LATUDA) 80 mg, Daily    metFORMIN (GLUCOPHAGE) 500 mg, Oral, 2 times daily with meals    traZODone (DESYREL) 100 mg, Nightly    venlafaxine " "(EFFEXOR-XR) 150 mg, Oral, Daily    WEGOVY 0.25 mg, Subcutaneous, Every 7 days      I have reviewed the PMH, social history, FamilyHx, surgical history, allergies and medications documented / confirmed by the patient at the time of this visit.    Objective:   /80   Pulse (!) 112   Temp 97.8 °F (36.6 °C)   Ht 5' 6" (1.676 m)   Wt (!) 168.3 kg (371 lb)   LMP 08/15/2024 (Approximate)   SpO2 97%   BMI 59.88 kg/m²   Physical Exam    Vitals: Weight: 371 lbs.  General: No acute distress. Well-developed. Well-nourished.  Eyes: EOMI. Sclerae anicteric.  HENT: Normocephalic. Atraumatic. Nares patent. Moist oral mucosa.  Ears: Bilateral TMs clear. Bilateral EACs clear.  Cardiovascular: Regular rate. Regular rhythm. No murmurs. No rubs. No gallops. Normal S1, S2.  Respiratory: Normal respiratory effort. Clear to auscultation bilaterally. No rales. No rhonchi. No wheezing.  Abdomen: Soft. Non-tender. Non-distended. Normoactive bowel sounds.  Musculoskeletal: No  obvious deformity.  Extremities: No lower extremity edema.  Neurological: Alert & oriented x3. No slurred speech. Normal gait.  Psychiatric: Normal mood. Normal affect. Good insight. Good judgment.  Skin: Warm. Dry. No rash.          Assessment:     1. Bipolar 2 disorder, major depressive episode    2. Prediabetes    3. BMI 60.0-69.9, adult      MDM:   Moderate complexity, exacerbation of chronic condition requiring referral, discussion about potential for having major surgery (gastric sleeve), medication management for other chronic condition including filling out paperwork.  Visit today included increased complexity associated with the care of the episodic problem see above assessment addressed and managing the longitudinal care of the patient due to the serious and/or complex managed problem(s) see above.    I have reviewed and summarized old records.  I have performed thorough medication reconciliation today and discussed risk and benefits of " medications.  I have reviewed labs and discussed with patient.  All questions were answered.    I have signed for the following orders AND/OR meds.  Orders Placed This Encounter   Procedures    Ambulatory referral/consult to Psychology     Standing Status:   Future     Standing Expiration Date:   12/15/2025     Referral Priority:   Routine     Referral Type:   Psychiatric     Referral Reason:   Specialty Services Required     Requested Specialty:   Psychology     Number of Visits Requested:   1     Medications Ordered This Encounter   Medications    semaglutide, weight loss, (WEGOVY) 0.25 mg/0.5 mL PnIj     Sig: Inject 0.25 mg into the skin every 7 days.     Dispense:  2 mL     Refill:  0        Follow up in about 4 weeks (around 12/13/2024) for Virtual Visit.      If no improvement in symptoms or symptoms worsen, advised to call/follow-up at clinic or go to ER. Patient voiced understanding and all questions/concerns were addressed.     DISCLAIMER: This note was compiled by using a speech recognition dictation system and therefore please be aware that typographical / speech recognition errors can and do occur.  Please contact me if you see any errors specifically.     This note was generated with the assistance of ambient listening technology. Verbal consent was obtained by the patient and accompanying visitor(s) for the recording of patient appointment to facilitate this note. I attest to having reviewed and edited the generated note for accuracy, though some syntax or spelling errors may persist. Please contact the author of this note for any clarification.      Joanna Hansen DO, RDN    Office: [phone]   [address] [place] [address]  FAX: [phone]

## 2024-11-15 NOTE — PATIENT INSTRUCTIONS
Bg Grissom,     If you are due for any health screening(s) below please notify me so we can arrange them to be ordered and scheduled. Most healthy patients at your age complete them, but you are free to accept or refuse.     If you can't do it, I'll definitely understand. If you can, I'd certainly appreciate it!    Tests to Keep You Healthy    Cervical Cancer Screening: DUE  Last Blood Pressure <= 139/89 (10/15/2024): Yes  Tobacco Cessation: NO      Your cervical cancer screening is due     Our records indicate that you may be overdue for your screening Pap smear. A Pap smear is an important health screening that can detect abnormal cells that can become cervical cancer. Cervical cancer screenings allow for early diagnosis and increase the likelihood of successful treatment.     The current recommendation for Pap smear screening is every 3-5 years for women at average risk. We encourage you to schedule your appointment with your womens health provider. Many women see a gynecologist for this screening, but some primary care providers also provide Pap screening.     If you recently had your Pap smear screening performed outside of [facility], please let your health care team know so that they can update your health record.      Were here to help you quit smoking     Our records indicated that you are still smoking. One of the best things you can do for your health is to stop smoking and we are here to help.     Talk with your provider about our Smoking Cessation Program and how we can support you on your journey.

## 2024-11-18 ENCOUNTER — TELEPHONE (OUTPATIENT)
Dept: FAMILY MEDICINE | Facility: CLINIC | Age: 39
End: 2024-11-18
Payer: MEDICARE

## 2024-11-18 ENCOUNTER — PATIENT OUTREACH (OUTPATIENT)
Dept: ADMINISTRATIVE | Facility: HOSPITAL | Age: 39
End: 2024-11-18
Payer: MEDICARE

## 2024-11-18 NOTE — LETTER
AUTHORIZATION FOR RELEASE OF   CONFIDENTIAL INFORMATION        We are seeing Praveena Rowe, date of birth 1985, in the clinic at [facility]. Joanna Hansen DO is the patient's PCP. Praveena Rowe has an outstanding lab/procedure at the time we reviewed her chart. In order to help keep her health information updated, she has authorized us to request the following medical record(s):                                                                   ( x )  PAP SMEAR                                                   Please fax records to [facility], Lee, Rebecca, DO,  at [phone] or email to [email or web address].               Patient Name: Praveena Rowe  : 1985  Patient Phone #: [phone]

## 2024-12-12 ENCOUNTER — TELEPHONE (OUTPATIENT)
Dept: FAMILY MEDICINE | Facility: CLINIC | Age: 39
End: 2024-12-12
Payer: MEDICARE

## 2024-12-13 ENCOUNTER — OFFICE VISIT (OUTPATIENT)
Dept: FAMILY MEDICINE | Facility: CLINIC | Age: 39
End: 2024-12-13
Payer: MEDICARE

## 2024-12-13 ENCOUNTER — PATIENT OUTREACH (OUTPATIENT)
Dept: ADMINISTRATIVE | Facility: HOSPITAL | Age: 39
End: 2024-12-13
Payer: MEDICARE

## 2024-12-13 VITALS
SYSTOLIC BLOOD PRESSURE: 108 MMHG | HEIGHT: 67 IN | HEART RATE: 77 BPM | OXYGEN SATURATION: 99 % | TEMPERATURE: 98 F | DIASTOLIC BLOOD PRESSURE: 72 MMHG | BODY MASS INDEX: 45.99 KG/M2 | WEIGHT: 293 LBS | RESPIRATION RATE: 18 BRPM

## 2024-12-13 DIAGNOSIS — E55.9 VITAMIN D DEFICIENCY: ICD-10-CM

## 2024-12-13 DIAGNOSIS — L30.4 INTERTRIGO: Primary | ICD-10-CM

## 2024-12-13 DIAGNOSIS — E88.810 METABOLIC SYNDROME: ICD-10-CM

## 2024-12-13 DIAGNOSIS — E03.9 ACQUIRED HYPOTHYROIDISM: ICD-10-CM

## 2024-12-13 DIAGNOSIS — I10 PRIMARY HYPERTENSION: ICD-10-CM

## 2024-12-13 DIAGNOSIS — R73.03 PREDIABETES: ICD-10-CM

## 2024-12-13 DIAGNOSIS — E88.819 INSULIN RESISTANCE: ICD-10-CM

## 2024-12-13 DIAGNOSIS — E78.2 MIXED HYPERLIPIDEMIA: ICD-10-CM

## 2024-12-13 PROCEDURE — 99999 PR PBB SHADOW E&M-EST. PATIENT-LVL V: CPT | Mod: PBBFAC,,, | Performed by: DIETITIAN, REGISTERED

## 2024-12-13 RX ORDER — DOXYLAMINE SUCCINATE 25 MG
TABLET ORAL 2 TIMES DAILY
Qty: 141 G | Refills: 2 | Status: SHIPPED | OUTPATIENT
Start: 2024-12-13

## 2024-12-13 RX ORDER — FLUCONAZOLE 200 MG/1
200 TABLET ORAL ONCE
Qty: 1 TABLET | Refills: 0 | Status: SHIPPED | OUTPATIENT
Start: 2024-12-13 | End: 2024-12-13

## 2024-12-13 NOTE — PROGRESS NOTES
PLAN:    Assessment & Plan    IMPRESSION:  - Diagnosed intertrigo in thigh and breast areas, likely due to yeast infection  - Recommend antifungal treatment to address both symptoms and underlying cause  - Considered patient's weight as contributing factor to recurring symptoms  - Noted blood pressure well-controlled on current medication regimen    INTERTRIGO:  - Explained the connection between yeast overgrowth and the patient's symptoms, including odor.  - Discussed that antibacterial products are not effective for yeast infections and may exacerbate the condition.  - Patient to keep affected areas dry to prevent recurrence of intertrigo.  - Recommend using antifungal powder in addition to prescribed cream.  - Patient to use barrier methods zinc oxide cream.  - Started antifungal cream for intertrigo, apply 2 times daily to affected areas on thighs and under breasts.    OBESITY, BMI > 60 WITH PREDIABETES AND METABOLIC SYNDROME:  - Referred to bariatric surgery program.  - Continued metformin at current dose.    HYPERTENSION:  - Continued Norvasc 5 mg for blood pressure management.  - Contact the office if blood pressure readings start to decrease, to potentially adjust medication.    DEPRESSION AND INSOMNIA:  - Continued Effexor, Trazodone, and Latuda at current doses.  - Sent message to Sushma regarding psychiatry referral (getting her scheduled in [place]).    HYPOTHYROIDISM:  - Continued Synthroid at current dose.    HYPERLIPIDEMIA:  - Continued Lipitor at current dose.    VITAMIN B12 AND D DEFICIENCY:  - Continued B12 and vitamin D at current doses.    FOLLOW-UP:  - Follow up in 3 months to assess progress with weight loss efforts and medication management.        Problem List Items Addressed This Visit       Prediabetes    Hypothyroidism    HLD (hyperlipidemia)    Primary hypertension    BMI 60.0-69.9, adult    Relevant Orders    Ambulatory referral/consult to Bariatric/Obesity Medicine    Vitamin D  deficiency    Metabolic syndrome    Insulin resistance    Intertrigo - Primary    Relevant Medications    miconazole (MICOTIN) 2 % cream    fluconazole (DIFLUCAN) 200 MG Tab     Future Appointments       Date Provider Specialty Appt Notes    1/22/2025  Bariatrics  Arrive at: Telehealth BMI 60.0-69.9, adult [Z68.44]    3/13/2025 Joanna Hansen DO Family Medicine 3 mth f/u           Medication Management for assessment above:   Medication List with Changes/Refills   New Medications    FLUCONAZOLE (DIFLUCAN) 200 MG TAB    Take 1 tablet (200 mg total) by mouth once. for 1 dose    MICONAZOLE (MICOTIN) 2 % CREAM    Apply topically 2 (two) times daily.   Current Medications    AMLODIPINE (NORVASC) 5 MG TABLET    Take 1 tablet (5 mg total) by mouth once daily.    ATORVASTATIN (LIPITOR) 20 MG TABLET    Take 1 tablet (20 mg total) by mouth every evening.    CYANOCOBALAMIN, VITAMIN B-12, 1,000 MCG SUBL    Place 1,000 mcg under the tongue once daily.    ERGOCALCIFEROL (ERGOCALCIFEROL) 50,000 UNIT CAP    Take 1 capsule (50,000 Units total) by mouth every 7 days.    LEVOTHYROXINE (SYNTHROID) 25 MCG TABLET    Take 1 tablet (25 mcg total) by mouth before breakfast.    LURASIDONE (LATUDA) 80 MG TAB TABLET    Take 80 mg by mouth once daily.    METFORMIN (GLUCOPHAGE) 500 MG TABLET    Take 1 tablet (500 mg total) by mouth 2 (two) times daily with meals.    SEMAGLUTIDE, WEIGHT LOSS, (WEGOVY) 0.25 MG/0.5 ML PNIJ    Inject 0.25 mg into the skin every 7 days.    TRAZODONE (DESYREL) 100 MG TABLET    Take 100 mg by mouth every evening.    VENLAFAXINE (EFFEXOR-XR) 150 MG CP24    Take 1 capsule (150 mg total) by mouth once daily.       Joanna Hansen DO, RDN  ==========================================================================  Subjective:   Patient ID: Praveena Rowe is a 39 y.o. female.  has a past medical history of Agoraphobia (10/15/2024), Anxiety, Bipolar 1 disorder, depressed, severe, Bipolar affective disorder, currently depressed,  moderate (10/15/2024), Depression, Diabetes mellitus, History of bipolar disorder (03/21/2024), Insomnia, Non-cardiac chest pain (03/21/2024), Preop testing (03/21/2024), Substance abuse (10/15/2024), Suicidal ideation (06/03/2023), and Thyroid disease.   Chief Complaint: Rash (Between thighs, burn and itch)      History of Present Illness    CHIEF COMPLAINT:  Patient presents today for a recurring rash.    SKIN CONCERNS:  She experiences a recurring rash in the intertriginous areas of her thighs and under breasts due to friction and heat, which worsens with clothing. The affected areas become crusty with desquamation, and are associated with hyperhidrosis and malodor. Despite using baby powder, the areas remain humid. She manages the thigh symptoms by sleeping with a soft blanket between her legs.    MEDICAL HISTORY:  She was previously enrolled in a bariatric surgery program but was discontinued due to missed appointments. She reports a 6-month waiting period before being eligible to reapply.    HYPERTENSION:  She reports improvement in previously elevated blood pressure. She is currently taking Norvasc but does not monitor blood pressure at home.    CURRENT MEDICATIONS:  She takes Metformin, Effexor, Trazodone, Latuda, Synthroid, Lipitor, Norvasc, B12, and vitamin D weekly.      ROS:  General: -fever, -chills, -fatigue, -weight gain, -weight loss  Eyes: -vision changes, -redness, -discharge  ENT: -ear pain, -nasal congestion, -sore throat  Cardiovascular: -chest pain, -palpitations, -lower extremity edema  Respiratory: -cough, -shortness of breath  Gastrointestinal: -abdominal pain, -nausea, -vomiting, -diarrhea, -constipation, -blood in stool  Genitourinary: -dysuria, -hematuria, -frequency  Musculoskeletal: -joint pain, -muscle pain  Skin: +rash, -lesion  Neurological: -headache, -dizziness, -numbness, -tingling  Psychiatric: -anxiety, -depression, -sleep difficulty  Endocrine: +excessive sweating       "    Problem List Items Addressed This Visit       Prediabetes    Hypothyroidism    HLD (hyperlipidemia)    Primary hypertension    BMI 60.0-69.9, adult    Vitamin D deficiency    Metabolic syndrome    Insulin resistance    Intertrigo - Primary        Review of patient's allergies indicates:   Allergen Reactions    Amoxicillin Hives     Current Outpatient Medications   Medication Instructions    amLODIPine (NORVASC) 5 mg, Oral, Daily    atorvastatin (LIPITOR) 20 mg, Oral, Nightly    cyanocobalamin (vitamin B-12) 1,000 mcg, Sublingual, Daily    ergocalciferol (ERGOCALCIFEROL) 50,000 Units, Oral, Every 7 days    fluconazole (DIFLUCAN) 200 mg, Oral, Once    levothyroxine (SYNTHROID) 25 mcg, Oral, Before breakfast    lurasidone (LATUDA) 80 mg, Daily    metFORMIN (GLUCOPHAGE) 500 mg, Oral, 2 times daily with meals    miconazole (MICOTIN) 2 % cream Topical (Top), 2 times daily    traZODone (DESYREL) 100 mg, Nightly    venlafaxine (EFFEXOR-XR) 150 mg, Oral, Daily    WEGOVY 0.25 mg, Subcutaneous, Every 7 days      I have reviewed the PMH, social history, FamilyHx, surgical history, allergies and medications documented / confirmed by the patient at the time of this visit.    Objective:   /72   Pulse 77   Temp 98 °F (36.7 °C)   Resp 18   Ht 5' 7" (1.702 m)   Wt (!) 179.5 kg (395 lb 11.2 oz)   LMP 08/15/2024 (Approximate)   SpO2 99%   BMI 61.98 kg/m²   Physical Exam    General: No acute distress. Well-developed. Well-nourished.  Eyes: EOMI. Sclerae anicteric.  HENT: Normocephalic. Atraumatic. Nares patent. Moist oral mucosa.  Ears: Bilateral TMs clear. Bilateral EACs clear.  Cardiovascular: Regular rate. Regular rhythm. No murmurs. No rubs. No gallops. Normal S1, S2.  Respiratory: Normal respiratory effort. Clear to auscultation bilaterally. No rales. No rhonchi. No wheezing.  Abdomen: Soft. Non-tender. Non-distended. Normoactive bowel sounds.  Musculoskeletal: No  obvious deformity.  Extremities: No lower " extremity edema.  Neurological: Alert & oriented x3. No slurred speech. Normal gait.  Psychiatric: Normal mood. Normal affect. Good insight. Good judgment.  Skin: Warm. Dry. Red inflamed rash present abdominal beneath pannus, areas of dry hyperpigmentation bilateral upper thighs and groin region.          Assessment:     1. Intertrigo    2. Primary hypertension    3. BMI 60.0-69.9, adult    4. Prediabetes    5. Metabolic syndrome    6. Insulin resistance    7. Vitamin D deficiency    8. Acquired hypothyroidism    9. Mixed hyperlipidemia      MDM:   Moderate complexity, more than 2 chronic conditions requiring medication management, unique testing, interpretation of testing and follow up.    Visit today included increased complexity associated with the care of the episodic problem see above assessment addressed and managing the longitudinal care of the patient due to the serious and/or complex managed problem(s) see above.    I have reviewed and summarized old records.  I have performed thorough medication reconciliation today and discussed risk and benefits of medications.  I have reviewed labs and discussed with patient.  All questions were answered.    I have signed for the following orders AND/OR meds.  Orders Placed This Encounter   Procedures    Ambulatory referral/consult to Bariatric/Obesity Medicine     Standing Status:   Future     Standing Expiration Date:   1/13/2026     Referral Priority:   Routine     Referral Type:   Consultation     Referral Reason:   Specialty Services Required     Requested Specialty:   Bariatrics     Number of Visits Requested:   1     Medications Ordered This Encounter   Medications    fluconazole (DIFLUCAN) 200 MG Tab     Sig: Take 1 tablet (200 mg total) by mouth once. for 1 dose     Dispense:  1 tablet     Refill:  0    miconazole (MICOTIN) 2 % cream     Sig: Apply topically 2 (two) times daily.     Dispense:  141 g     Refill:  2        Follow up in about 3 months (around  3/13/2025).  Future Appointments       Date Provider Specialty Appt Notes    1/22/2025  Bariatrics  Arrive at: Telehealth BMI 60.0-69.9, adult [Z68.44]    3/13/2025 Joanna Hansen DO Family Medicine 3 mth f/u            If no improvement in symptoms or symptoms worsen, advised to call/follow-up at clinic or go to ER. Patient voiced understanding and all questions/concerns were addressed.     DISCLAIMER: This note was compiled by using a speech recognition dictation system and therefore please be aware that typographical / speech recognition errors can and do occur.  Please contact me if you see any errors specifically.     This note was generated with the assistance of ambient listening technology. Verbal consent was obtained by the patient and accompanying visitor(s) for the recording of patient appointment to facilitate this note. I attest to having reviewed and edited the generated note for accuracy, though some syntax or spelling errors may persist. Please contact the author of this note for any clarification.      Joanna Hansen DO, RDN    Office: [phone] [address] [place] [address]  FAX: [phone]

## 2024-12-13 NOTE — Clinical Note
Can we follow up on her psychiatry referral, she had spoken to someone and she preferred [facility] but no one from the [facility] office has called to schedule her.

## 2025-01-05 DIAGNOSIS — R73.03 PREDIABETES: ICD-10-CM

## 2025-01-05 NOTE — TELEPHONE ENCOUNTER
No care due was identified.  [facility] Embedded Care Due Messages. Reference number: [id number].   1/05/2025 11:20:51 AM CST

## 2025-01-06 RX ORDER — METFORMIN HYDROCHLORIDE 500 MG/1
500 TABLET ORAL 2 TIMES DAILY WITH MEALS
Qty: 180 TABLET | Refills: 3 | OUTPATIENT
Start: 2025-01-06

## 2025-01-07 RX ORDER — METFORMIN HYDROCHLORIDE 500 MG/1
500 TABLET ORAL 2 TIMES DAILY WITH MEALS
Qty: 180 TABLET | Refills: 3 | Status: SHIPPED | OUTPATIENT
Start: 2025-01-07

## 2025-02-07 ENCOUNTER — TELEPHONE (OUTPATIENT)
Dept: BARIATRICS | Facility: CLINIC | Age: 40
End: 2025-02-07
Payer: MEDICARE

## 2025-02-18 ENCOUNTER — TELEPHONE (OUTPATIENT)
Dept: BARIATRICS | Facility: CLINIC | Age: 40
End: 2025-02-18
Payer: MEDICARE

## 2025-02-18 ENCOUNTER — PATIENT MESSAGE (OUTPATIENT)
Dept: FAMILY MEDICINE | Facility: CLINIC | Age: 40
End: 2025-02-18
Payer: MEDICARE